# Patient Record
Sex: FEMALE | Race: ASIAN | NOT HISPANIC OR LATINO | URBAN - METROPOLITAN AREA
[De-identification: names, ages, dates, MRNs, and addresses within clinical notes are randomized per-mention and may not be internally consistent; named-entity substitution may affect disease eponyms.]

---

## 2024-05-06 ENCOUNTER — EMERGENCY (EMERGENCY)
Facility: HOSPITAL | Age: 59
LOS: 1 days | Discharge: HOME | End: 2024-05-06
Attending: EMERGENCY MEDICINE
Payer: MEDICAID

## 2024-05-06 VITALS
RESPIRATION RATE: 16 BRPM | HEART RATE: 74 BPM | SYSTOLIC BLOOD PRESSURE: 153 MMHG | DIASTOLIC BLOOD PRESSURE: 90 MMHG | TEMPERATURE: 98 F | OXYGEN SATURATION: 98 %

## 2024-05-06 VITALS
OXYGEN SATURATION: 99 % | HEART RATE: 81 BPM | TEMPERATURE: 99 F | WEIGHT: 158.07 LBS | HEIGHT: 62 IN | RESPIRATION RATE: 17 BRPM | DIASTOLIC BLOOD PRESSURE: 88 MMHG | SYSTOLIC BLOOD PRESSURE: 168 MMHG

## 2024-05-06 LAB
ALBUMIN SERPL ELPH-MCNC: 3.7 G/DL — SIGNIFICANT CHANGE UP (ref 3.5–5)
ALP SERPL-CCNC: 103 U/L — SIGNIFICANT CHANGE UP (ref 40–120)
ALT FLD-CCNC: 19 U/L DA — SIGNIFICANT CHANGE UP (ref 10–60)
ANION GAP SERPL CALC-SCNC: 4 MMOL/L — LOW (ref 5–17)
AST SERPL-CCNC: 11 U/L — SIGNIFICANT CHANGE UP (ref 10–40)
BASOPHILS # BLD AUTO: 0.04 K/UL — SIGNIFICANT CHANGE UP (ref 0–0.2)
BASOPHILS NFR BLD AUTO: 0.3 % — SIGNIFICANT CHANGE UP (ref 0–2)
BILIRUB SERPL-MCNC: 0.4 MG/DL — SIGNIFICANT CHANGE UP (ref 0.2–1.2)
BUN SERPL-MCNC: 9 MG/DL — SIGNIFICANT CHANGE UP (ref 7–18)
CALCIUM SERPL-MCNC: 9.1 MG/DL — SIGNIFICANT CHANGE UP (ref 8.4–10.5)
CHLORIDE SERPL-SCNC: 108 MMOL/L — SIGNIFICANT CHANGE UP (ref 96–108)
CO2 SERPL-SCNC: 28 MMOL/L — SIGNIFICANT CHANGE UP (ref 22–31)
CREAT SERPL-MCNC: 0.73 MG/DL — SIGNIFICANT CHANGE UP (ref 0.5–1.3)
EGFR: 95 ML/MIN/1.73M2 — SIGNIFICANT CHANGE UP
EOSINOPHIL # BLD AUTO: 0.07 K/UL — SIGNIFICANT CHANGE UP (ref 0–0.5)
EOSINOPHIL NFR BLD AUTO: 0.6 % — SIGNIFICANT CHANGE UP (ref 0–6)
GLUCOSE SERPL-MCNC: 159 MG/DL — HIGH (ref 70–99)
HCT VFR BLD CALC: 39.6 % — SIGNIFICANT CHANGE UP (ref 34.5–45)
HGB BLD-MCNC: 12 G/DL — SIGNIFICANT CHANGE UP (ref 11.5–15.5)
IMM GRANULOCYTES NFR BLD AUTO: 0.3 % — SIGNIFICANT CHANGE UP (ref 0–0.9)
LIDOCAIN IGE QN: 29 U/L — SIGNIFICANT CHANGE UP (ref 13–75)
LYMPHOCYTES # BLD AUTO: 3.9 K/UL — HIGH (ref 1–3.3)
LYMPHOCYTES # BLD AUTO: 31.6 % — SIGNIFICANT CHANGE UP (ref 13–44)
MCHC RBC-ENTMCNC: 22.8 PG — LOW (ref 27–34)
MCHC RBC-ENTMCNC: 30.3 GM/DL — LOW (ref 32–36)
MCV RBC AUTO: 75.1 FL — LOW (ref 80–100)
MONOCYTES # BLD AUTO: 0.47 K/UL — SIGNIFICANT CHANGE UP (ref 0–0.9)
MONOCYTES NFR BLD AUTO: 3.8 % — SIGNIFICANT CHANGE UP (ref 2–14)
NEUTROPHILS # BLD AUTO: 7.82 K/UL — HIGH (ref 1.8–7.4)
NEUTROPHILS NFR BLD AUTO: 63.4 % — SIGNIFICANT CHANGE UP (ref 43–77)
NRBC # BLD: 0 /100 WBCS — SIGNIFICANT CHANGE UP (ref 0–0)
PLATELET # BLD AUTO: 393 K/UL — SIGNIFICANT CHANGE UP (ref 150–400)
POTASSIUM SERPL-MCNC: 4.1 MMOL/L — SIGNIFICANT CHANGE UP (ref 3.5–5.3)
POTASSIUM SERPL-SCNC: 4.1 MMOL/L — SIGNIFICANT CHANGE UP (ref 3.5–5.3)
PROT SERPL-MCNC: 8.3 G/DL — SIGNIFICANT CHANGE UP (ref 6–8.3)
RBC # BLD: 5.27 M/UL — HIGH (ref 3.8–5.2)
RBC # FLD: 15.7 % — HIGH (ref 10.3–14.5)
SODIUM SERPL-SCNC: 140 MMOL/L — SIGNIFICANT CHANGE UP (ref 135–145)
WBC # BLD: 12.34 K/UL — HIGH (ref 3.8–10.5)
WBC # FLD AUTO: 12.34 K/UL — HIGH (ref 3.8–10.5)

## 2024-05-06 PROCEDURE — 83690 ASSAY OF LIPASE: CPT

## 2024-05-06 PROCEDURE — 74177 CT ABD & PELVIS W/CONTRAST: CPT | Mod: 26,MC

## 2024-05-06 PROCEDURE — 74177 CT ABD & PELVIS W/CONTRAST: CPT | Mod: MC

## 2024-05-06 PROCEDURE — 99284 EMERGENCY DEPT VISIT MOD MDM: CPT | Mod: 25

## 2024-05-06 PROCEDURE — 36415 COLL VENOUS BLD VENIPUNCTURE: CPT

## 2024-05-06 PROCEDURE — 80053 COMPREHEN METABOLIC PANEL: CPT

## 2024-05-06 PROCEDURE — 85025 COMPLETE CBC W/AUTO DIFF WBC: CPT

## 2024-05-06 PROCEDURE — 99285 EMERGENCY DEPT VISIT HI MDM: CPT

## 2024-05-06 RX ORDER — SODIUM CHLORIDE 9 MG/ML
1000 INJECTION, SOLUTION INTRAVENOUS ONCE
Refills: 0 | Status: COMPLETED | OUTPATIENT
Start: 2024-05-06 | End: 2024-05-06

## 2024-05-06 RX ORDER — ONDANSETRON 8 MG/1
4 TABLET, FILM COATED ORAL ONCE
Refills: 0 | Status: COMPLETED | OUTPATIENT
Start: 2024-05-06 | End: 2024-05-06

## 2024-05-06 RX ADMIN — Medication 30 MILLILITER(S): at 20:18

## 2024-05-06 RX ADMIN — SODIUM CHLORIDE 1000 MILLILITER(S): 9 INJECTION, SOLUTION INTRAVENOUS at 20:19

## 2024-05-06 NOTE — ED ADULT NURSE NOTE - NSFALLUNIVINTERV_ED_ALL_ED
Bed/Stretcher in lowest position, wheels locked, appropriate side rails in place/Call bell, personal items and telephone in reach/Instruct patient to call for assistance before getting out of bed/chair/stretcher/Non-slip footwear applied when patient is off stretcher/[place] to call system/Physically safe environment - no spills, clutter or unnecessary equipment/Purposeful proactive rounding/Room/bathroom lighting operational, light cord in reach

## 2024-05-06 NOTE — ED PROVIDER NOTE - PATIENT PORTAL LINK FT
You can access the FollowMyHealth Patient Portal offered by [facility] by registering at the following website: [email or web address]. By joining [employer]’s FollowMyHealth portal, you will also be able to view your health information using other applications (apps) compatible with our system.

## 2024-05-06 NOTE — ED PROVIDER NOTE - OBJECTIVE STATEMENT
58 yr old female with hx of HLD, HTN, DM presents to ed c/o abd bloating and pain [id number]. had colonoscopy with dr whitaker this am and received propofol 200mg IV. pt had internal hemorrhoid II and polypectomy. no nv, no fever.

## 2024-05-06 NOTE — ED PROVIDER NOTE - NSFOLLOWUPINSTRUCTIONS_ED_ALL_ED_FT
abdominal pain- Small hiatal hernia, ovarian cyst likely hemorrhagic on CT, cholelithiasis but no free air.  I suspect post colonoscopy discomfort advised to stay hydrated and stay active.  Return if symptoms worsens

## 2024-05-06 NOTE — ED PROVIDER NOTE - CLINICAL SUMMARY MEDICAL DECISION MAKING FREE TEXT BOX
58 yr old female with hx of HLD, HTN, DM presents to ed c/o abd bloating and pain [id number]. had colonoscopy with dr whitaker this am and received propofol 200mg IV. pt had internal hemorrhoid II and polypectomy. no nv, no fever.    r/o perforation vs gas bloating post colonoscopy. ct, labs, meds, -reassess

## 2024-05-06 NOTE — ED PROVIDER NOTE - PROGRESS NOTE DETAILS
appiah: Labs and CT reviewed.  Small hiatal hernia, ovarian cyst likely hemorrhagic on CT, cholelithiasis but no free air.  Labs unremarkable patient tolerating liquids still complaining of epigastric discomfort.  Not consistent with ACS at this point I suspect post colonoscopy discomfort advised to stay hydrated and stay active.  Return if symptoms worsens

## 2024-06-02 ENCOUNTER — INPATIENT (INPATIENT)
Facility: HOSPITAL | Age: 59
LOS: 5 days | Discharge: HOME | End: 2024-06-08
Attending: STUDENT IN AN ORGANIZED HEALTH CARE EDUCATION/TRAINING PROGRAM | Admitting: STUDENT IN AN ORGANIZED HEALTH CARE EDUCATION/TRAINING PROGRAM
Payer: MEDICAID

## 2024-06-02 VITALS
DIASTOLIC BLOOD PRESSURE: 83 MMHG | HEART RATE: 78 BPM | RESPIRATION RATE: 189 BRPM | OXYGEN SATURATION: 99 % | TEMPERATURE: 98 F | SYSTOLIC BLOOD PRESSURE: 136 MMHG

## 2024-06-02 LAB
ALBUMIN SERPL ELPH-MCNC: 4 G/DL — SIGNIFICANT CHANGE UP (ref 3.3–5)
ALP SERPL-CCNC: 82 U/L — SIGNIFICANT CHANGE UP (ref 40–120)
ALT FLD-CCNC: 11 U/L — SIGNIFICANT CHANGE UP (ref 4–33)
ANION GAP SERPL CALC-SCNC: 13 MMOL/L — SIGNIFICANT CHANGE UP (ref 7–14)
ANISOCYTOSIS BLD QL: SLIGHT — SIGNIFICANT CHANGE UP
APPEARANCE UR: ABNORMAL
AST SERPL-CCNC: 14 U/L — SIGNIFICANT CHANGE UP (ref 4–32)
BACTERIA # UR AUTO: ABNORMAL /HPF
BASOPHILS # BLD AUTO: 0 K/UL — SIGNIFICANT CHANGE UP (ref 0–0.2)
BASOPHILS NFR BLD AUTO: 0 % — SIGNIFICANT CHANGE UP (ref 0–2)
BILIRUB SERPL-MCNC: 0.2 MG/DL — SIGNIFICANT CHANGE UP (ref 0.2–1.2)
BILIRUB UR-MCNC: NEGATIVE — SIGNIFICANT CHANGE UP
BUN SERPL-MCNC: 8 MG/DL — SIGNIFICANT CHANGE UP (ref 7–23)
CALCIUM SERPL-MCNC: 9.3 MG/DL — SIGNIFICANT CHANGE UP (ref 8.4–10.5)
CAST: 0 /LPF — SIGNIFICANT CHANGE UP (ref 0–4)
CHLORIDE SERPL-SCNC: 105 MMOL/L — SIGNIFICANT CHANGE UP (ref 98–107)
CO2 SERPL-SCNC: 23 MMOL/L — SIGNIFICANT CHANGE UP (ref 22–31)
COLOR SPEC: YELLOW — SIGNIFICANT CHANGE UP
CREAT SERPL-MCNC: 0.61 MG/DL — SIGNIFICANT CHANGE UP (ref 0.5–1.3)
DACRYOCYTES BLD QL SMEAR: SLIGHT — SIGNIFICANT CHANGE UP
DIFF PNL FLD: NEGATIVE — SIGNIFICANT CHANGE UP
EGFR: 104 ML/MIN/1.73M2 — SIGNIFICANT CHANGE UP
ELLIPTOCYTES BLD QL SMEAR: SLIGHT — SIGNIFICANT CHANGE UP
EOSINOPHIL # BLD AUTO: 0 K/UL — SIGNIFICANT CHANGE UP (ref 0–0.5)
EOSINOPHIL NFR BLD AUTO: 0 % — SIGNIFICANT CHANGE UP (ref 0–6)
GIANT PLATELETS BLD QL SMEAR: PRESENT — SIGNIFICANT CHANGE UP
GLUCOSE SERPL-MCNC: 117 MG/DL — HIGH (ref 70–99)
GLUCOSE UR QL: NEGATIVE MG/DL — SIGNIFICANT CHANGE UP
HCT VFR BLD CALC: 36.2 % — SIGNIFICANT CHANGE UP (ref 34.5–45)
HGB BLD-MCNC: 11.6 G/DL — SIGNIFICANT CHANGE UP (ref 11.5–15.5)
HYPOCHROMIA BLD QL: SLIGHT — SIGNIFICANT CHANGE UP
IANC: 5.36 K/UL — SIGNIFICANT CHANGE UP (ref 1.8–7.4)
KETONES UR-MCNC: NEGATIVE MG/DL — SIGNIFICANT CHANGE UP
LEUKOCYTE ESTERASE UR-ACNC: ABNORMAL
LIDOCAIN IGE QN: 23 U/L — SIGNIFICANT CHANGE UP (ref 7–60)
LYMPHOCYTES # BLD AUTO: 3.49 K/UL — HIGH (ref 1–3.3)
LYMPHOCYTES # BLD AUTO: 35.1 % — SIGNIFICANT CHANGE UP (ref 13–44)
MACROCYTES BLD QL: SLIGHT — SIGNIFICANT CHANGE UP
MCHC RBC-ENTMCNC: 23.4 PG — LOW (ref 27–34)
MCHC RBC-ENTMCNC: 32 GM/DL — SIGNIFICANT CHANGE UP (ref 32–36)
MCV RBC AUTO: 73.1 FL — LOW (ref 80–100)
MICROCYTES BLD QL: SLIGHT — SIGNIFICANT CHANGE UP
MONOCYTES # BLD AUTO: 0.44 K/UL — SIGNIFICANT CHANGE UP (ref 0–0.9)
MONOCYTES NFR BLD AUTO: 4.4 % — SIGNIFICANT CHANGE UP (ref 2–14)
NEUTROPHILS # BLD AUTO: 5.67 K/UL — SIGNIFICANT CHANGE UP (ref 1.8–7.4)
NEUTROPHILS NFR BLD AUTO: 57 % — SIGNIFICANT CHANGE UP (ref 43–77)
NITRITE UR-MCNC: NEGATIVE — SIGNIFICANT CHANGE UP
PH UR: 8 — SIGNIFICANT CHANGE UP (ref 5–8)
PLAT MORPH BLD: NORMAL — SIGNIFICANT CHANGE UP
PLATELET # BLD AUTO: 356 K/UL — SIGNIFICANT CHANGE UP (ref 150–400)
PLATELET COUNT - ESTIMATE: NORMAL — SIGNIFICANT CHANGE UP
POIKILOCYTOSIS BLD QL AUTO: SIGNIFICANT CHANGE UP
POLYCHROMASIA BLD QL SMEAR: SLIGHT — SIGNIFICANT CHANGE UP
POTASSIUM SERPL-MCNC: 4.5 MMOL/L — SIGNIFICANT CHANGE UP (ref 3.5–5.3)
POTASSIUM SERPL-SCNC: 4.5 MMOL/L — SIGNIFICANT CHANGE UP (ref 3.5–5.3)
PROT SERPL-MCNC: 7.4 G/DL — SIGNIFICANT CHANGE UP (ref 6–8.3)
PROT UR-MCNC: NEGATIVE MG/DL — SIGNIFICANT CHANGE UP
RBC # BLD: 4.95 M/UL — SIGNIFICANT CHANGE UP (ref 3.8–5.2)
RBC # FLD: 15.9 % — HIGH (ref 10.3–14.5)
RBC BLD AUTO: ABNORMAL
RBC CASTS # UR COMP ASSIST: 0 /HPF — SIGNIFICANT CHANGE UP (ref 0–4)
SCHISTOCYTES BLD QL AUTO: SLIGHT — SIGNIFICANT CHANGE UP
SODIUM SERPL-SCNC: 141 MMOL/L — SIGNIFICANT CHANGE UP (ref 135–145)
SP GR SPEC: 1.01 — SIGNIFICANT CHANGE UP (ref 1–1.03)
SQUAMOUS # UR AUTO: 20 /HPF — HIGH (ref 0–5)
TARGETS BLD QL SMEAR: SLIGHT — SIGNIFICANT CHANGE UP
TROPONIN T, HIGH SENSITIVITY RESULT: 8 NG/L — SIGNIFICANT CHANGE UP
UROBILINOGEN FLD QL: 0.2 MG/DL — SIGNIFICANT CHANGE UP (ref 0.2–1)
VARIANT LYMPHS # BLD: 3.5 % — SIGNIFICANT CHANGE UP (ref 0–6)
WBC # BLD: 9.95 K/UL — SIGNIFICANT CHANGE UP (ref 3.8–10.5)
WBC # FLD AUTO: 9.95 K/UL — SIGNIFICANT CHANGE UP (ref 3.8–10.5)
WBC UR QL: 17 /HPF — HIGH (ref 0–5)

## 2024-06-02 PROCEDURE — 99285 EMERGENCY DEPT VISIT HI MDM: CPT

## 2024-06-02 PROCEDURE — 76705 ECHO EXAM OF ABDOMEN: CPT | Mod: 26

## 2024-06-02 RX ORDER — ACETAMINOPHEN 500 MG
1000 TABLET ORAL ONCE
Refills: 0 | Status: COMPLETED | OUTPATIENT
Start: 2024-06-02 | End: 2024-06-02

## 2024-06-02 RX ORDER — MORPHINE SULFATE 50 MG/1
4 CAPSULE, EXTENDED RELEASE ORAL ONCE
Refills: 0 | Status: DISCONTINUED | OUTPATIENT
Start: 2024-06-02 | End: 2024-06-02

## 2024-06-02 RX ADMIN — Medication 400 MILLIGRAM(S): at 21:16

## 2024-06-02 RX ADMIN — MORPHINE SULFATE 4 MILLIGRAM(S): 50 CAPSULE, EXTENDED RELEASE ORAL at 21:21

## 2024-06-02 NOTE — ED PROVIDER NOTE - ATTENDING CONTRIBUTION TO CARE
58F p/w SOB and abd pain.  Was dx with ovarian mass? unclear if pt getting treatment.  EPig and RUQ ttp.  Pt in a lot of pain.  Lungs clear.  No fever.  EKG SR at 70 no shadia no std no twi  qtc 442.  Pt was seen at [facility] several times and admitted in late May until yesterday with similar complaint, had CTPA and CT a/p showing 4.7cm R adnexa heterogenous mass, also gallstones.  CTPA was negative.  Pt was seen by OBGYN there and given a follow up appointment in July.  Med list also copied into chart.  amlodipine, asa, atorvastatin, famotidine, glimepiride, hydroxyzine, losartan, melatonin, metformin.    Pt mild distress abd pain.  Plan recheck CT abd and CTPA.  Rx pain meds and fluids.  If Ov mass appreciated, GYN consult.  Pt would benefit from IP biopsy given abd pain.    VS:  unremarkable    GEN - malaise, abd pain, mild distress;   A+O x3   HEAD - NC/AT     ENT - PEERL, EOMI, mucous membranes   dry , no discharge      NECK: Neck supple, non-tender without lymphadenopathy, no masses, no JVD  PULM - CTA b/l,  symmetric breath sounds  COR -  normal heart sounds    ABD - , ND, upper abd ttp, periumbilical ttp, soft,  BACK - no CVA tenderness, nontender spine     EXTREMS - no edema, no deformity, warm and well perfused    SKIN - no rash    or bruising      NEUROLOGIC - alert, face symmetric, speech fluent, sensation nl, motor no focal deficit.

## 2024-06-02 NOTE — ED PROVIDER NOTE - CARE PLAN
1 Principal Discharge DX:	Shortness of breath   Principal Discharge DX:	Shortness of breath  Secondary Diagnosis:	Abdominal pain

## 2024-06-02 NOTE — ED ADULT NURSE NOTE - NSFALLRISKINTERV_ED_ALL_ED

## 2024-06-02 NOTE — ED PROVIDER NOTE - OBJECTIVE STATEMENT
see mdm see mdm    DD ED ATTF p/w SOB and abd pain.  Was dx with ovarian mass? unclear if pt getting treatment.  EPig and RUQ ttp.  Pt in a lot of pain.  Lungs clear.  No fever.  EKG SR at 70 no shadia no std no twi  qtc 442.  Pt was seen at [facility] several times and admitted in late May until yesterday with similar complaint, had CTPA and CT a/p showing 4.7cm R adnexa heterogenous mass.  CTPA was negative.  Pt was seen by OBGYN there and given a follow up appointment in July.  Med list also copied into chart.  amlodipine, asa, atorvastatin, famotidine, glimepiride, hydroxyzine, losartan, melatonin, metformin.

## 2024-06-02 NOTE — ED ADULT NURSE NOTE - OBJECTIVE STATEMENT
presents with sob/inability to sleep 2/2 abd pain. Endorsed she recently had a mass that she had to remove but unclear on history. Husbands knows more information.

## 2024-06-02 NOTE — ED PROVIDER NOTE - CLINICAL SUMMARY MEDICAL DECISION MAKING FREE TEXT BOX
58-year-old woman with history of diabetes, hypertension, hyperlipidemia, cholelithiasis, complex cystic right adnexal mass presents to the ED with abdominal pain and shortness of breath for the past day.  Patient states she had multiple ED visits for the same symptoms.  Patient denies nausea, vomiting, fevers, chills.  Patient afebrile hemodynamically stable.  Concern for PE, cholecystitis, ACS.  Dispo pending labs, imaging and reassessment.

## 2024-06-03 DIAGNOSIS — R10.9 UNSPECIFIED ABDOMINAL PAIN: ICD-10-CM

## 2024-06-03 DIAGNOSIS — R06.02 SHORTNESS OF BREATH: ICD-10-CM

## 2024-06-03 DIAGNOSIS — I10 ESSENTIAL (PRIMARY) HYPERTENSION: ICD-10-CM

## 2024-06-03 DIAGNOSIS — Z29.9 ENCOUNTER FOR PROPHYLACTIC MEASURES, UNSPECIFIED: ICD-10-CM

## 2024-06-03 DIAGNOSIS — E11.9 TYPE 2 DIABETES MELLITUS WITHOUT COMPLICATIONS: ICD-10-CM

## 2024-06-03 DIAGNOSIS — N94.89 OTHER SPECIFIED CONDITIONS ASSOCIATED WITH FEMALE GENITAL ORGANS AND MENSTRUAL CYCLE: ICD-10-CM

## 2024-06-03 LAB
ADD ON TEST-SPECIMEN IN LAB: SIGNIFICANT CHANGE UP
ALBUMIN SERPL ELPH-MCNC: 4.1 G/DL — SIGNIFICANT CHANGE UP (ref 3.3–5)
ALP SERPL-CCNC: 87 U/L — SIGNIFICANT CHANGE UP (ref 40–120)
ALT FLD-CCNC: 14 U/L — SIGNIFICANT CHANGE UP (ref 4–33)
ANION GAP SERPL CALC-SCNC: 10 MMOL/L — SIGNIFICANT CHANGE UP (ref 7–14)
AST SERPL-CCNC: 15 U/L — SIGNIFICANT CHANGE UP (ref 4–32)
BASOPHILS # BLD AUTO: 0.03 K/UL — SIGNIFICANT CHANGE UP (ref 0–0.2)
BASOPHILS NFR BLD AUTO: 0.4 % — SIGNIFICANT CHANGE UP (ref 0–2)
BILIRUB SERPL-MCNC: 0.3 MG/DL — SIGNIFICANT CHANGE UP (ref 0.2–1.2)
BUN SERPL-MCNC: 8 MG/DL — SIGNIFICANT CHANGE UP (ref 7–23)
CALCIUM SERPL-MCNC: 9.3 MG/DL — SIGNIFICANT CHANGE UP (ref 8.4–10.5)
CHLORIDE SERPL-SCNC: 105 MMOL/L — SIGNIFICANT CHANGE UP (ref 98–107)
CO2 SERPL-SCNC: 27 MMOL/L — SIGNIFICANT CHANGE UP (ref 22–31)
CREAT SERPL-MCNC: 0.57 MG/DL — SIGNIFICANT CHANGE UP (ref 0.5–1.3)
CRP SERPL-MCNC: 3.2 MG/L — SIGNIFICANT CHANGE UP
CULTURE RESULTS: SIGNIFICANT CHANGE UP
EGFR: 105 ML/MIN/1.73M2 — SIGNIFICANT CHANGE UP
EOSINOPHIL # BLD AUTO: 0.13 K/UL — SIGNIFICANT CHANGE UP (ref 0–0.5)
EOSINOPHIL NFR BLD AUTO: 1.7 % — SIGNIFICANT CHANGE UP (ref 0–6)
ERYTHROCYTE [SEDIMENTATION RATE] IN BLOOD: 28 MM/HR — HIGH (ref 4–25)
GLUCOSE BLDC GLUCOMTR-MCNC: 116 MG/DL — HIGH (ref 70–99)
GLUCOSE BLDC GLUCOMTR-MCNC: 118 MG/DL — HIGH (ref 70–99)
GLUCOSE BLDC GLUCOMTR-MCNC: 141 MG/DL — HIGH (ref 70–99)
GLUCOSE SERPL-MCNC: 122 MG/DL — HIGH (ref 70–99)
HCT VFR BLD CALC: 38.4 % — SIGNIFICANT CHANGE UP (ref 34.5–45)
HGB BLD-MCNC: 11.5 G/DL — SIGNIFICANT CHANGE UP (ref 11.5–15.5)
IANC: 3.97 K/UL — SIGNIFICANT CHANGE UP (ref 1.8–7.4)
IMM GRANULOCYTES NFR BLD AUTO: 0.3 % — SIGNIFICANT CHANGE UP (ref 0–0.9)
LYMPHOCYTES # BLD AUTO: 2.95 K/UL — SIGNIFICANT CHANGE UP (ref 1–3.3)
LYMPHOCYTES # BLD AUTO: 39 % — SIGNIFICANT CHANGE UP (ref 13–44)
MAGNESIUM SERPL-MCNC: 2.5 MG/DL — SIGNIFICANT CHANGE UP (ref 1.6–2.6)
MCHC RBC-ENTMCNC: 22.5 PG — LOW (ref 27–34)
MCHC RBC-ENTMCNC: 29.9 GM/DL — LOW (ref 32–36)
MCV RBC AUTO: 75.3 FL — LOW (ref 80–100)
MONOCYTES # BLD AUTO: 0.46 K/UL — SIGNIFICANT CHANGE UP (ref 0–0.9)
MONOCYTES NFR BLD AUTO: 6.1 % — SIGNIFICANT CHANGE UP (ref 2–14)
NEUTROPHILS # BLD AUTO: 3.97 K/UL — SIGNIFICANT CHANGE UP (ref 1.8–7.4)
NEUTROPHILS NFR BLD AUTO: 52.5 % — SIGNIFICANT CHANGE UP (ref 43–77)
NRBC # BLD: 0 /100 WBCS — SIGNIFICANT CHANGE UP (ref 0–0)
NRBC # FLD: 0 K/UL — SIGNIFICANT CHANGE UP (ref 0–0)
PHOSPHATE SERPL-MCNC: 4.2 MG/DL — SIGNIFICANT CHANGE UP (ref 2.5–4.5)
PLATELET # BLD AUTO: 361 K/UL — SIGNIFICANT CHANGE UP (ref 150–400)
POTASSIUM SERPL-MCNC: 4.2 MMOL/L — SIGNIFICANT CHANGE UP (ref 3.5–5.3)
POTASSIUM SERPL-SCNC: 4.2 MMOL/L — SIGNIFICANT CHANGE UP (ref 3.5–5.3)
PROCALCITONIN SERPL-MCNC: 0.04 NG/ML — SIGNIFICANT CHANGE UP (ref 0.02–0.1)
PROT SERPL-MCNC: 7.5 G/DL — SIGNIFICANT CHANGE UP (ref 6–8.3)
RBC # BLD: 5.1 M/UL — SIGNIFICANT CHANGE UP (ref 3.8–5.2)
RBC # FLD: 15.9 % — HIGH (ref 10.3–14.5)
SODIUM SERPL-SCNC: 142 MMOL/L — SIGNIFICANT CHANGE UP (ref 135–145)
SPECIMEN SOURCE: SIGNIFICANT CHANGE UP
TROPONIN T, HIGH SENSITIVITY RESULT: 8 NG/L — SIGNIFICANT CHANGE UP
WBC # BLD: 7.56 K/UL — SIGNIFICANT CHANGE UP (ref 3.8–10.5)
WBC # FLD AUTO: 7.56 K/UL — SIGNIFICANT CHANGE UP (ref 3.8–10.5)

## 2024-06-03 PROCEDURE — 74177 CT ABD & PELVIS W/CONTRAST: CPT | Mod: 26,MC

## 2024-06-03 PROCEDURE — 99223 1ST HOSP IP/OBS HIGH 75: CPT | Mod: GC

## 2024-06-03 PROCEDURE — 72196 MRI PELVIS W/DYE: CPT | Mod: 26

## 2024-06-03 PROCEDURE — 78226 HEPATOBILIARY SYSTEM IMAGING: CPT | Mod: 26,GC

## 2024-06-03 PROCEDURE — 71275 CT ANGIOGRAPHY CHEST: CPT | Mod: 26,MC

## 2024-06-03 PROCEDURE — 76830 TRANSVAGINAL US NON-OB: CPT | Mod: 26

## 2024-06-03 RX ORDER — PANTOPRAZOLE SODIUM 20 MG/1
40 TABLET, DELAYED RELEASE ORAL DAILY
Refills: 0 | Status: DISCONTINUED | OUTPATIENT
Start: 2024-06-03 | End: 2024-06-04

## 2024-06-03 RX ORDER — ENOXAPARIN SODIUM 100 MG/ML
40 INJECTION SUBCUTANEOUS EVERY 24 HOURS
Refills: 0 | Status: DISCONTINUED | OUTPATIENT
Start: 2024-06-03 | End: 2024-06-08

## 2024-06-03 RX ORDER — FAMOTIDINE 10 MG/ML
20 INJECTION INTRAVENOUS ONCE
Refills: 0 | Status: COMPLETED | OUTPATIENT
Start: 2024-06-03 | End: 2024-06-03

## 2024-06-03 RX ORDER — GLUCAGON INJECTION, SOLUTION 0.5 MG/.1ML
1 INJECTION, SOLUTION SUBCUTANEOUS ONCE
Refills: 0 | Status: DISCONTINUED | OUTPATIENT
Start: 2024-06-03 | End: 2024-06-08

## 2024-06-03 RX ORDER — INSULIN LISPRO 100/ML
VIAL (ML) SUBCUTANEOUS EVERY 6 HOURS
Refills: 0 | Status: DISCONTINUED | OUTPATIENT
Start: 2024-06-03 | End: 2024-06-08

## 2024-06-03 RX ORDER — KETOROLAC TROMETHAMINE 30 MG/ML
15 SYRINGE (ML) INJECTION ONCE
Refills: 0 | Status: DISCONTINUED | OUTPATIENT
Start: 2024-06-03 | End: 2024-06-03

## 2024-06-03 RX ORDER — SODIUM CHLORIDE 9 MG/ML
1000 INJECTION, SOLUTION INTRAVENOUS
Refills: 0 | Status: DISCONTINUED | OUTPATIENT
Start: 2024-06-03 | End: 2024-06-05

## 2024-06-03 RX ORDER — SODIUM CHLORIDE 9 MG/ML
1000 INJECTION, SOLUTION INTRAVENOUS ONCE
Refills: 0 | Status: COMPLETED | OUTPATIENT
Start: 2024-06-03 | End: 2024-06-03

## 2024-06-03 RX ORDER — LOSARTAN POTASSIUM 100 MG/1
25 TABLET, FILM COATED ORAL DAILY
Refills: 0 | Status: DISCONTINUED | OUTPATIENT
Start: 2024-06-03 | End: 2024-06-06

## 2024-06-03 RX ORDER — AMLODIPINE BESYLATE 2.5 MG/1
5 TABLET ORAL DAILY
Refills: 0 | Status: DISCONTINUED | OUTPATIENT
Start: 2024-06-03 | End: 2024-06-06

## 2024-06-03 RX ORDER — LOSARTAN POTASSIUM 100 MG/1
1 TABLET, FILM COATED ORAL
Refills: 0 | DISCHARGE

## 2024-06-03 RX ORDER — METFORMIN HYDROCHLORIDE 850 MG/1
1 TABLET ORAL
Refills: 0 | DISCHARGE

## 2024-06-03 RX ORDER — DEXTROSE 50 % IN WATER 50 %
25 SYRINGE (ML) INTRAVENOUS ONCE
Refills: 0 | Status: DISCONTINUED | OUTPATIENT
Start: 2024-06-03 | End: 2024-06-08

## 2024-06-03 RX ORDER — DEXTROSE 50 % IN WATER 50 %
15 SYRINGE (ML) INTRAVENOUS ONCE
Refills: 0 | Status: DISCONTINUED | OUTPATIENT
Start: 2024-06-03 | End: 2024-06-08

## 2024-06-03 RX ORDER — DEXTROSE 50 % IN WATER 50 %
12.5 SYRINGE (ML) INTRAVENOUS ONCE
Refills: 0 | Status: DISCONTINUED | OUTPATIENT
Start: 2024-06-03 | End: 2024-06-08

## 2024-06-03 RX ADMIN — SODIUM CHLORIDE 100 MILLILITER(S): 9 INJECTION, SOLUTION INTRAVENOUS at 11:40

## 2024-06-03 RX ADMIN — SODIUM CHLORIDE 1000 MILLILITER(S): 9 INJECTION, SOLUTION INTRAVENOUS at 10:26

## 2024-06-03 RX ADMIN — MORPHINE SULFATE 4 MILLIGRAM(S): 50 CAPSULE, EXTENDED RELEASE ORAL at 00:06

## 2024-06-03 RX ADMIN — Medication 1000 MILLIGRAM(S): at 00:06

## 2024-06-03 RX ADMIN — Medication 30 MILLILITER(S): at 05:05

## 2024-06-03 RX ADMIN — FAMOTIDINE 20 MILLIGRAM(S): 10 INJECTION INTRAVENOUS at 05:05

## 2024-06-03 RX ADMIN — PANTOPRAZOLE SODIUM 40 MILLIGRAM(S): 20 TABLET, DELAYED RELEASE ORAL at 10:28

## 2024-06-03 RX ADMIN — Medication 0.25 MILLIGRAM(S): at 15:34

## 2024-06-03 NOTE — CONSULT NOTE ADULT - SUBJECTIVE AND OBJECTIVE BOX
SURGERY CONSULT NOTE    HPI:    57 yo F w/ PMHx of DM, HTN, cholelithiasis, complex cystic right adnexal mass no PSHx presents to  ED with abdominal pain and shortness of breath worsening over the past day admitted to medicine for further w/u.     In the ED, underwent CT A/P showed complex R adnexal lesion and cholelithiais. RUQ US showed cholelithasis, layer gallbladder sludge, CBD 3mm. Normal WBC, T bili, LFTs. Surgery consulted for r/o acute sami.     Patient states has had intermittent RUQ pain x 5 months w/ multiple ED visits at OSH diagnosed with galltones and right adnexal cystic lesion. Was told to follow-up with outpatient Gyn and surgery however has bene unable to do so. Also was seen by GI who completed EGD/Colonoscopy in May, which showed gastritis, hemorrhoids and single non-bleeding polyp in the ascending colon and s/p biopsy taken. Patient does not have with her biopsy results.     MRI pelvis eval R adnexal cyst pending       PAST MEDICAL HISTORY:  No pertinent past medical history    HTN (hypertension)    HLD (hyperlipidemia)    Diabetes mellitus        PAST SURGICAL HISTORY:  No significant past surgical history        MEDICATIONS:  amLODIPine   Tablet 5 milliGRAM(s) Oral daily  dextrose 50% Injectable 25 Gram(s) IV Push once  dextrose 50% Injectable 12.5 Gram(s) IV Push once  dextrose 50% Injectable 25 Gram(s) IV Push once  dextrose Oral Gel 15 Gram(s) Oral once  glucagon  Injectable 1 milliGRAM(s) IntraMuscular once  insulin lispro (ADMELOG) corrective regimen sliding scale   SubCutaneous every 6 hours  lactated ringers. 1000 milliLiter(s) IV Continuous <Continuous>  losartan 25 milliGRAM(s) Oral daily  pantoprazole  Injectable 40 milliGRAM(s) IV Push daily      ALLERGIES:  No Known Allergies      VITALS & I/Os:  Vital Signs Last 24 Hrs  T(C): 36.8 (03 Jun 2024 10:18), Max: 36.9 (02 Jun 2024 21:23)  T(F): 98.3 (03 Jun 2024 10:18), Max: 98.4 (02 Jun 2024 21:23)  HR: 60 (03 Jun 2024 10:18) (56 - 78)  BP: 133/78 (03 Jun 2024 10:18) (120/73 - 143/93)  BP(mean): --  RR: 18 (03 Jun 2024 10:18) (16 - 189)  SpO2: 97% (03 Jun 2024 10:18) (97% - 100%)    Parameters below as of 03 Jun 2024 10:18  Patient On (Oxygen Delivery Method): room air      PHYSICAL EXAM:  General: No acute distress  Respiratory: Nonlabored  Cardiovascular: RRR  Abdominal: Soft, nondistended, tender to palpation in R hemiabdomen. No rebound or guarding. No organomegaly, no palpable mass.  Extremities: Warm    LABS:                        11.5   7.56  )-----------( 361      ( 03 Jun 2024 07:59 )             38.4     06-03    142  |  105  |  8   ----------------------------<  122<H>  4.2   |  27  |  0.57    Ca    9.3      03 Jun 2024 07:59  Phos  4.2     06-03  Mg     2.50     06-03    TPro  7.5  /  Alb  4.1  /  TBili  0.3  /  DBili  x   /  AST  15  /  ALT  14  /  AlkPhos  87  06-03      Urinalysis Basic - ( 03 Jun 2024 07:59 )    Color: x / Appearance: x / SG: x / pH: x  Gluc: 122 mg/dL / Ketone: x  / Bili: x / Urobili: x   Blood: x / Protein: x / Nitrite: x   Leuk Esterase: x / RBC: x / WBC x   Sq Epi: x / Non Sq Epi: x / Bacteria: x

## 2024-06-03 NOTE — H&P ADULT - HISTORY OF PRESENT ILLNESS
58-year-old woman with history of diabetes, hypertension, hyperlipidemia, cholelithiasis, complex cystic right adnexal mass presents to the ED with abdominal pain and shortness of breath worsening over the past day. Mentions since she had a fall in Jan 2024 has had severe and constant abdominal pain in upper abdomen. Describes it as sharp 10/10 pain in b/l upper abdominal quadrants thats worse when palpating abdomen. Associated with burning sensation in sternum region. Pain is sometimes worse with eating. Constant throughout the day.  Mentions to have had multiple ED visits since then for abdominal pain, last being in May 1st. Imaging found at that time solid echogenic mass that was described as cystic in right adnexal region. Was told to follow-up with outpatient Gyn, however, has been unable to. Also was seen by GI who completed EGD/Colonoscopy in May, which showed gastritis, hemorrhoids and single non-bleeding polyp in the ascending colon and s/p biopsy taken. Patient does not have with her biopsy results. Currently denies fevers, chills, runny nose, sore throat, changes in BMs or urinary symptoms.     ED course: vitals stable, satting fine on RA. Labs were unremarkable, LFTs wnl. CT chest confirmed no PE seen. CT A/P showed complex R adnexal lesion that may be subserosal fibroid vs ovarian lesion US transvaginal obtained which showed echogenic R ovarian lesion suggestive of ovarian cyst.  US abdomen was obtained, which showed cholelithiasis and hepatic steatosis but no cholecystitis. Received Famotidine 20, Morphine 4 mg, Maalox, and Tylenol

## 2024-06-03 NOTE — H&P ADULT - PROBLEM SELECTOR PLAN 1
P/w consistent and severe abdominal pain, more so worse in upper quadrant region. Rodarte sign positive significantly on exam.   -CT A/P: A 4.3 cm complex right adnexal lesion which may represent a subserosal fibroid or ovarian lesion. Ultrasound correlation is recommended.  -US abdomen: Cholelithiasis and Hepatic steatosis.  -LFTs wnl  -unclear etiology--> less likely adnexal mass is causing significant abdominal pain  -concerns for biliary colic vs developing cholecystitis, though imaging is negative, vs gastritis   -Surgery consulted, pending further recs  -NPO unless cleared by Surgery, r/o cholecystitis  -PPI w/ protonix 40mg qd  -

## 2024-06-03 NOTE — H&P ADULT - ASSESSMENT
58-year-old woman with history of diabetes, hypertension, hyperlipidemia, cholelithiasis, complex cystic right adnexal mass presents to the ED with abdominal pain and shortness of breath found to have known adnexal mass and concerns for biliary colic vs cholecystitis admitted for further work-up and management.

## 2024-06-03 NOTE — H&P ADULT - PROBLEM SELECTOR PLAN 4
Home regimen: Amlodipine 5 mg qd and Losartan 25mg qd  -resume home amlodipine and losartan with hold paramters

## 2024-06-03 NOTE — H&P ADULT - NSHPLABSRESULTS_GEN_ALL_CORE
11.5   7.56  )-----------( 361      ( 03 Jun 2024 07:59 )             38.4       06-03    142  |  105  |  8   ----------------------------<  122<H>  4.2   |  27  |  0.57    Ca    9.3      03 Jun 2024 07:59  Phos  4.2     06-03  Mg     2.50     06-03    TPro  7.5  /  Alb  4.1  /  TBili  0.3  /  DBili  x   /  AST  15  /  ALT  14  /  AlkPhos  87  06-03              Urinalysis Basic - ( 03 Jun 2024 07:59 )    Color: x / Appearance: x / SG: x / pH: x  Gluc: 122 mg/dL / Ketone: x  / Bili: x / Urobili: x   Blood: x / Protein: x / Nitrite: x   Leuk Esterase: x / RBC: x / WBC x   Sq Epi: x / Non Sq Epi: x / Bacteria: x            Lactate Trend            CAPILLARY BLOOD GLUCOSE      POCT Blood Glucose.: 130 mg/dL (02 Jun 2024 20:29)

## 2024-06-03 NOTE — H&P ADULT - ATTENDING COMMENTS
Pt p/w several months of colicky abd pain of undetermined etiology, though distribution in RUQ and +Rodarte's sign on exam suggest possible hepatobiliary source. US without signs consistent with cholecystitis. Nevertheless, agree with Surgery that HIDA should be pursued, especially given DM2. Pt also with right adnexal mass which, at this time, does not seem to explain the patient's abd pain. Will obtain MR to better characterize this lesion. Monitor glucose closely.

## 2024-06-03 NOTE — ED ADULT NURSE REASSESSMENT NOTE - CONDITION
Patient refused IV toradol. Attending Dr. Carter aware. States that her doctor said she couldn't have motrin. Transported to ultrasound. NAD noted. Will continue to monitor.

## 2024-06-03 NOTE — H&P ADULT - NSICDXPASTMEDICALHX_GEN_ALL_CORE_FT
PAST MEDICAL HISTORY:  Diabetes mellitus     HLD (hyperlipidemia)     HTN (hypertension)     No pertinent past medical history

## 2024-06-03 NOTE — PATIENT PROFILE ADULT - FALL HARM RISK - HARM RISK INTERVENTIONS

## 2024-06-03 NOTE — ED ADULT NURSE REASSESSMENT NOTE - CONDITION
Patient resting comfortably with  at bedside. Report taken from EDDIE Harris Bed placed in lowest position. Pillow given. NAD noted. Awaiting ct scan. Will continue to monitor.

## 2024-06-03 NOTE — ED ADULT NURSE REASSESSMENT NOTE - CONDITION
Report taken from EDDIE Roa Patient resting comfortably. Stretcher in lowest postiion. Side rails up. NAD noted. Will continue to monitor.

## 2024-06-03 NOTE — PHYSICAL THERAPY INITIAL EVALUATION ADULT - PATIENT PROFILE REVIEW, REHAB EVAL
PT initial evaluation received and chart review completed. Pt agreeable to participate in PT evaluation. ACTIVITY:/yes PT initial evaluation received and chart review completed. Pt agreeable to participate in PT evaluation. ACTIVITY: AMBULATE AS TOLERATED/yes

## 2024-06-03 NOTE — PATIENT PROFILE ADULT - NSPRESCRUSEDDRG_GEN_A_NUR
[facility]  Dermatology  [address] [address]  Phone: [phone]  Fax: [phone]    [address] [address]  Phone: [phone]  Fax: [phone]    [facility]  Dermatology  [address] [address]  Phone: [phone]  Fax:   Follow Up Time:     [address] [address]  Phone: [phone]  Fax: [phone]  Follow Up Time:     [facility]  Dermatology  [address] [address]  Phone: [phone]  Fax: [phone]    [address] [address]  Phone: [phone]  Fax: [phone]    [facility]  Dermatology  [address] [address]  Phone: [phone]  Fax:   Follow Up Time:     [address] [address]  Phone: [phone]  Fax: [phone]  Follow Up Time:     Rash    A rash is a change in the color of the skin. A rash can also change the way your skin feels. There are many different conditions and factors that can cause a rash.     Follow these instructions at home:  Pay attention to any changes in your symptoms. Follow these instructions to help with your condition:    Medicine    Take or apply over-the-counter and prescription medicines only as told by your health care provider. These may include:    Corticosteroid cream.  Anti-itch lotions.  Oral antihistamines.    Skin Care    Apply cool compresses to the affected areas.  Try taking a bath with:  Epsom salts. Follow the instructions on the packaging. You can get these at your local pharmacy or grocery store.  Baking soda. Pour a small amount into the bath as told by your health care provider.  Colloidal oatmeal. Follow the instructions on the packaging. You can get this at your local pharmacy or grocery store.  Try applying baking soda paste to your skin. Stir water into baking soda until it reaches a paste-like consistency.  Do not scratch or rub your skin.  Avoid covering the rash. Make sure the rash is exposed to air as much as possible.    General instructions    Avoid hot showers or baths, which can make itching worse. A cold shower may help.  Avoid scented soaps, detergents, and perfumes. Use gentle soaps, detergents, perfumes, and other cosmetic products.  Avoid any substance that causes your rash. Keep a journal to help track what causes your rash. Write down:  What you eat.  What cosmetic products you use.  What you drink.  What you wear. This includes jewelry.  Keep all follow-up visits as told by your health care provider. This is important.    Contact a health care provider if:  You sweat at night.  You lose weight.  You urinate more than normal.  You feel weak.  You vomit.  Your skin or the whites of your eyes look yellow (jaundice).  Your skin:  Tingles.  Is numb.  Your rash:  Does not go away after several days.  Gets worse.  You are:  Unusually thirsty.  More tired than normal.  You have:  New symptoms.  Pain in your abdomen.  A fever.  Diarrhea.    Get help right away if:  You develop a rash that covers all or most of your body. The rash may or may not be painful.  You develop blisters that:  Are on top of the rash.  Grow larger or grow together.  Are painful.  Are inside your nose or mouth.  You develop a rash that:  Looks like purple pinprick-sized spots all over your body.  Has a “bull's eye” or looks like a target.  Is not related to sun exposure, is red and painful, and causes your skin to peel.    ADDITIONAL NOTES AND INSTRUCTIONS    Please follow up with your Primary MD in 24-48 hr.  Seek immediate medical care for any new/worsening signs or symptoms.     Document Released: 12/8/2003 Document Revised: 5/23/2017 Document Reviewed: 5/4/2016  [employer] Interactive Patient Education ©2019 [employer] This information is not intended to replace advice given to you by your health care provider. Make sure you discuss any questions you have with your health care provider. THE PATIENT WAS GIVEN THE FOLLOWING INSTRUCTIONS BELOW IN: [place]    [facility]  Dermatology  [address] [address]  Phone: [phone]  Fax: [phone]    [address], [address] [address]  Phone: [phone]  Fax: [phone]    [facility]  Dermatology  [address] [address]  Phone: [phone]  Fax:   Follow Up Time:     [address] [address]  Phone: [phone]  Fax: [phone]  Follow Up Time:     [facility]  [address] [address]  Phone: [phone]  Fax: [phone]    [address], [address] [address]  Phone: [phone]  Fax: [phone]    [facility]  Dermatology  [address] [address]  Phone: [phone]  Fax:   Follow Up Time:     [address] [address]  Phone: [phone]  Fax: [phone]  Follow Up Time:     Rash    A rash is a change in the color of the skin. A rash can also change the way your skin feels. There are many different conditions and factors that can cause a rash.     Follow these instructions at home:  Pay attention to any changes in your symptoms. Follow these instructions to help with your condition:    Medicine    Take or apply over-the-counter and prescription medicines only as told by your health care provider. These may include:    Corticosteroid cream.  Anti-itch lotions.  Oral antihistamines.    Skin Care    Apply cool compresses to the affected areas.  Try taking a bath with:  Epsom salts. Follow the instructions on the packaging. You can get these at your local pharmacy or grocery store.  Baking soda. Pour a small amount into the bath as told by your health care provider.  Colloidal oatmeal. Follow the instructions on the packaging. You can get this at your local pharmacy or grocery store.  Try applying baking soda paste to your skin. Stir water into baking soda until it reaches a paste-like consistency.  Do not scratch or rub your skin.  Avoid covering the rash. Make sure the rash is exposed to air as much as possible.    General instructions    Avoid hot showers or baths, which can make itching worse. A cold shower may help.  Avoid scented soaps, detergents, and perfumes. Use gentle soaps, detergents, perfumes, and other cosmetic products.  Avoid any substance that causes your rash. Keep a journal to help track what causes your rash. Write down:  What you eat.  What cosmetic products you use.  What you drink.  What you wear. This includes jewelry.  Keep all follow-up visits as told by your health care provider. This is important.    Contact a health care provider if:  You sweat at night.  You lose weight.  You urinate more than normal.  You feel weak.  You vomit.  Your skin or the whites of your eyes look yellow (jaundice).  Your skin:  Tingles.  Is numb.  Your rash:  Does not go away after several days.  Gets worse.  You are:  Unusually thirsty.  More tired than normal.  You have:  New symptoms.  Pain in your abdomen.  A fever.  Diarrhea.    Get help right away if:  You develop a rash that covers all or most of your body. The rash may or may not be painful.  You develop blisters that:  Are on top of the rash.  Grow larger or grow together.  Are painful.  Are inside your nose or mouth.  You develop a rash that:  Looks like purple pinprick-sized spots all over your body.  Has a “bull's eye” or looks like a target.  Is not related to sun exposure, is red and painful, and causes your skin to peel.    ADDITIONAL NOTES AND INSTRUCTIONS    Please follow up with your Primary MD in 24-48 hr.  Seek immediate medical care for any new/worsening signs or symptoms.     Document Released: 12/8/2003 Document Revised: 5/23/2017 Document Reviewed: 5/4/2016  [employer] Interactive Patient Education ©2019 [employer] This information is not intended to replace advice given to you by your health care provider. Make sure you discuss any questions you have with your health care provider. No

## 2024-06-03 NOTE — ED ADULT NURSE REASSESSMENT NOTE - CONDITION
Gave report to EDDIE Casarez Patient comfortable. Vital signs documented. HR ranges from 48 to 63. MAR are aware. Will continue to monitor.

## 2024-06-03 NOTE — PHYSICAL THERAPY INITIAL EVALUATION ADULT - GENERAL OBSERVATIONS, REHAB EVAL
Upon entry, pt semi-supine in bed in NAD; + telemetry. HR 62 bpm. Pt left as received with all tubes/lines intact, call bell in reach and in NAD.

## 2024-06-03 NOTE — H&P ADULT - NSHPPHYSICALEXAM_GEN_ALL_CORE
T(C): 36.6 (06-03-24 @ 06:50), Max: 36.9 (06-02-24 @ 21:23)  HR: 62 (06-03-24 @ 06:50) (56 - 78)  BP: 125/63 (06-03-24 @ 06:50) (120/73 - 143/93)  RR: 18 (06-03-24 @ 06:50) (16 - 189)  SpO2: 99% (06-03-24 @ 06:50) (99% - 100%)    CONSTITUTIONAL: Well groomed, no apparent distress  EYES: PERRLA and symmetric, EOMI, No conjunctival or scleral injection, non-icteric  ENMT: Oral mucosa with moist membranes. Normal dentition; no pharyngeal injection or exudates             NECK: Supple, symmetric and without tracheal deviation   RESP: No respiratory distress, no use of accessory muscles; CTA b/l, no WRR  CV: RRR, +S1S2, no MRG; no JVD; no peripheral edema  GI: Soft, NT, ND, no rebound, no guarding; no palpable masses; no hepatosplenomegaly; no hernia palpated  LYMPH: No cervical LAD or tenderness; no axillary LAD or tenderness; no inguinal LAD or tenderness  MSK: Normal gait; No digital clubbing or cyanosis; examination of the (head/neck/spine/ribs/pelvis, RUE, LUE, RLE, LLE) without misalignment,            Normal ROM without pain, no spinal tenderness, normal muscle strength/tone  SKIN: No rashes or ulcers noted; no subcutaneous nodules or induration palpable  NEURO: CN II-XII intact; normal reflexes in upper and lower extremities, sensation intact in upper and lower extremities b/l to light touch   PSYCH: Appropriate insight/judgment; A+O x 3, mood and affect appropriate, recent/remote memory intact T(C): 36.6 (06-03-24 @ 06:50), Max: 36.9 (06-02-24 @ 21:23)  HR: 62 (06-03-24 @ 06:50) (56 - 78)  BP: 125/63 (06-03-24 @ 06:50) (120/73 - 143/93)  RR: 18 (06-03-24 @ 06:50) (16 - 189)  SpO2: 99% (06-03-24 @ 06:50) (99% - 100%)    CONSTITUTIONAL: Well groomed, no apparent distress  EYES: PERRLA and symmetric, EOMI, No conjunctival or scleral injection, non-icteric  ENMT: Oral mucosa with moist membranes. Normal dentition; no pharyngeal injection or exudates  RESP: No respiratory distress, no use of accessory muscles; CTA b/l, no WRR  CV: RRR, +S1S2, no MRG; no JVD; no peripheral edema  GI: nondistended, tenderness to palpation in LUQ, robertson sign positive; , no rebound, no guarding; no palpable masses; no hepatosplenomegaly; no hernia palpated  LYMPH: No cervical LAD or tenderness; no axillary LAD or tenderness; no inguinal LAD or tenderness  MSK: No digital clubbing or cyanosis; examination of the (head/neck/spine/ribs/pelvis, RUE, LUE, RLE, LLE) without misalignment,   SKIN: No rashes or ulcers noted; no subcutaneous nodules or induration palpable  NEURO: CN II-XII intact; normal reflexes in upper and lower extremities, sensation intact in upper and lower extremities b/l to light touch   PSYCH: Appropriate insight/judgment; A+O x 3, mood and affect appropriate, recent/remote memory intact

## 2024-06-03 NOTE — PHYSICAL THERAPY INITIAL EVALUATION ADULT - PERTINENT HX OF CURRENT PROBLEM, REHAB EVAL
Pt is a 58 year old female presenting with abdominal pain and shortness of breath. Of note, patient with multiple ED visits for abdominal pain; found to have solid echogenic mass that was described as cystic in right adnexal region and was told to follow-up with outpatient Gyn, however, has been unable to. CTA chest negative for PE. RUQ US abdomen revealed cholelithiasis and hepatic steatosis. CT A/P significant for complex right adnexal lesion which may represent a subserosal fibroid or ovarian lesion. US transvaginal revealed echogenic right ovarian lesion has a sonographic appearance suggesting a dermoid cyst. Pt admitted for abdominal pain and adnexal lesion; awaiting HIDA scan to rule out acute cholecystitis and MRI pelvis.

## 2024-06-03 NOTE — H&P ADULT - PROBLEM SELECTOR PLAN 5
Dvt ppx: Will hold for possible procedure, Lovenox 40mg qd once cleared  Diet: NPO until cleared by discharge, CC diet (no pork or beed) once cleared  Dispo: Pending PT consult

## 2024-06-03 NOTE — PHYSICAL THERAPY INITIAL EVALUATION ADULT - MANUAL MUSCLE TESTING RESULTS, REHAB EVAL
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

## 2024-06-03 NOTE — PATIENT PROFILE ADULT - NSTRANSFERBELONGINGSRESP_GEN_A_NUR
CHIEF COMPLAINT:  low back and leg pain    HISTORY OF PRESENT ILLNESS:  The patient has significant low back pain and leg pain. The patient is recommended to have lumbar interventional pain procedures. [facility]      PAST MEDICAL HISTORY: reviewed and documented in chart.   SOCIAL HISTORY: reviewed and documented in chart.   ALLERGIES: reviewed and documented in chart.   CURRENT MEDICATIONS: reviewed and documented in chart.   FAMILY HISTORY: reviewed and documented in chart.   REVIEW OF SYSTEMS: A 14-system review was negative except for problem mentioned above.     PHYSICAL EXAM:   VITALS: See nursing note; I have personally reviewed patient's OR holding area vital signs.   GENERAL: Patient is alert and oriented and in no obvious distress  PSYCH: Patient’s mood is appropriate with normal affect.   HEEN: eyes, nose and ears are without discharge.   LUNGS: non labored breathing, good chest expansion.  HEART: extremities warm and well perfused  ABDOMEN: soft and non-distended.  LUMBAR SPINE: Tenderness      ASSESSMENT:  lumbar radiculopathy    PLAN:  1. Bilateral L4-5 and L5-S1 transforaminal epidural steroid injection  2. Risks and benefits explained to patient. She has capacity to consent to the procedure. Consent obtained.  3. Re-evaluation in two weeks in clinic    yes

## 2024-06-03 NOTE — H&P ADULT - PROBLEM SELECTOR PLAN 2
CT A/P: A 4.3 cm complex right adnexal lesion which may represent a subserosal fibroid or ovarian lesion. Ultrasound correlation is recommended.  -MRI pending to further assess   -Will hold of on Gyn consult until MRI results come back

## 2024-06-03 NOTE — ED ADULT NURSE REASSESSMENT NOTE - NS ED NURSE REASSESS COMMENT FT1
Break RN: patient resting comfortably in stretcher, breathing even and unlabored. Offers no complaints at this time. Instructed to call for assistance. Stretcher in lowest position, wheels locked, appropriate side rails in place, call bell in reach. Pending dispo

## 2024-06-03 NOTE — ED ADULT NURSE REASSESSMENT NOTE - CONDITION
Patient c/o chest pain and shortness of breath. Vital signs taken. MD attending and resident at bedside. Appears well. NAD noted. will continue to monitor.

## 2024-06-04 LAB
ANION GAP SERPL CALC-SCNC: 17 MMOL/L — HIGH (ref 7–14)
APPEARANCE UR: CLEAR — SIGNIFICANT CHANGE UP
BACTERIA # UR AUTO: NEGATIVE /HPF — SIGNIFICANT CHANGE UP
BILIRUB UR-MCNC: NEGATIVE — SIGNIFICANT CHANGE UP
BUN SERPL-MCNC: 8 MG/DL — SIGNIFICANT CHANGE UP (ref 7–23)
CALCIUM SERPL-MCNC: 9.4 MG/DL — SIGNIFICANT CHANGE UP (ref 8.4–10.5)
CAST: 0 /LPF — SIGNIFICANT CHANGE UP (ref 0–4)
CHLORIDE SERPL-SCNC: 104 MMOL/L — SIGNIFICANT CHANGE UP (ref 98–107)
CO2 SERPL-SCNC: 16 MMOL/L — LOW (ref 22–31)
COLOR SPEC: YELLOW — SIGNIFICANT CHANGE UP
CREAT SERPL-MCNC: 0.5 MG/DL — SIGNIFICANT CHANGE UP (ref 0.5–1.3)
DIFF PNL FLD: NEGATIVE — SIGNIFICANT CHANGE UP
EGFR: 109 ML/MIN/1.73M2 — SIGNIFICANT CHANGE UP
GLUCOSE BLDC GLUCOMTR-MCNC: 108 MG/DL — HIGH (ref 70–99)
GLUCOSE BLDC GLUCOMTR-MCNC: 115 MG/DL — HIGH (ref 70–99)
GLUCOSE BLDC GLUCOMTR-MCNC: 133 MG/DL — HIGH (ref 70–99)
GLUCOSE BLDC GLUCOMTR-MCNC: 147 MG/DL — HIGH (ref 70–99)
GLUCOSE SERPL-MCNC: 102 MG/DL — HIGH (ref 70–99)
GLUCOSE UR QL: NEGATIVE MG/DL — SIGNIFICANT CHANGE UP
HCT VFR BLD CALC: 38.5 % — SIGNIFICANT CHANGE UP (ref 34.5–45)
HGB BLD-MCNC: 11.2 G/DL — LOW (ref 11.5–15.5)
KETONES UR-MCNC: NEGATIVE MG/DL — SIGNIFICANT CHANGE UP
LEUKOCYTE ESTERASE UR-ACNC: NEGATIVE — SIGNIFICANT CHANGE UP
MAGNESIUM SERPL-MCNC: 2.3 MG/DL — SIGNIFICANT CHANGE UP (ref 1.6–2.6)
MCHC RBC-ENTMCNC: 22.3 PG — LOW (ref 27–34)
MCHC RBC-ENTMCNC: 29.1 GM/DL — LOW (ref 32–36)
MCV RBC AUTO: 76.5 FL — LOW (ref 80–100)
NITRITE UR-MCNC: NEGATIVE — SIGNIFICANT CHANGE UP
NRBC # BLD: 0 /100 WBCS — SIGNIFICANT CHANGE UP (ref 0–0)
NRBC # FLD: 0 K/UL — SIGNIFICANT CHANGE UP (ref 0–0)
PH UR: 7.5 — SIGNIFICANT CHANGE UP (ref 5–8)
PHOSPHATE SERPL-MCNC: 4.6 MG/DL — HIGH (ref 2.5–4.5)
PLATELET # BLD AUTO: 380 K/UL — SIGNIFICANT CHANGE UP (ref 150–400)
POTASSIUM SERPL-MCNC: 4.9 MMOL/L — SIGNIFICANT CHANGE UP (ref 3.5–5.3)
POTASSIUM SERPL-SCNC: 4.9 MMOL/L — SIGNIFICANT CHANGE UP (ref 3.5–5.3)
PROT UR-MCNC: NEGATIVE MG/DL — SIGNIFICANT CHANGE UP
RBC # BLD: 5.03 M/UL — SIGNIFICANT CHANGE UP (ref 3.8–5.2)
RBC # FLD: 15.9 % — HIGH (ref 10.3–14.5)
RBC CASTS # UR COMP ASSIST: 0 /HPF — SIGNIFICANT CHANGE UP (ref 0–4)
SODIUM SERPL-SCNC: 137 MMOL/L — SIGNIFICANT CHANGE UP (ref 135–145)
SP GR SPEC: 1.01 — SIGNIFICANT CHANGE UP (ref 1–1.03)
SQUAMOUS # UR AUTO: 10 /HPF — HIGH (ref 0–5)
UROBILINOGEN FLD QL: 0.2 MG/DL — SIGNIFICANT CHANGE UP (ref 0.2–1)
WBC # BLD: 10.54 K/UL — HIGH (ref 3.8–10.5)
WBC # FLD AUTO: 10.54 K/UL — HIGH (ref 3.8–10.5)
WBC UR QL: 3 /HPF — SIGNIFICANT CHANGE UP (ref 0–5)

## 2024-06-04 PROCEDURE — 99233 SBSQ HOSP IP/OBS HIGH 50: CPT | Mod: GC

## 2024-06-04 RX ORDER — POLYETHYLENE GLYCOL 3350 17 G/17G
17 POWDER, FOR SOLUTION ORAL DAILY
Refills: 0 | Status: DISCONTINUED | OUTPATIENT
Start: 2024-06-04 | End: 2024-06-08

## 2024-06-04 RX ORDER — SENNA PLUS 8.6 MG/1
1 TABLET ORAL AT BEDTIME
Refills: 0 | Status: DISCONTINUED | OUTPATIENT
Start: 2024-06-04 | End: 2024-06-08

## 2024-06-04 RX ORDER — PANTOPRAZOLE SODIUM 20 MG/1
40 TABLET, DELAYED RELEASE ORAL
Refills: 0 | Status: DISCONTINUED | OUTPATIENT
Start: 2024-06-04 | End: 2024-06-05

## 2024-06-04 RX ORDER — OXYCODONE HYDROCHLORIDE 5 MG/1
2.5 TABLET ORAL EVERY 6 HOURS
Refills: 0 | Status: DISCONTINUED | OUTPATIENT
Start: 2024-06-04 | End: 2024-06-05

## 2024-06-04 RX ADMIN — SENNA PLUS 1 TABLET(S): 8.6 TABLET ORAL at 22:56

## 2024-06-04 RX ADMIN — OXYCODONE HYDROCHLORIDE 2.5 MILLIGRAM(S): 5 TABLET ORAL at 17:59

## 2024-06-04 RX ADMIN — POLYETHYLENE GLYCOL 3350 17 GRAM(S): 17 POWDER, FOR SOLUTION ORAL at 11:53

## 2024-06-04 RX ADMIN — Medication 30 MILLILITER(S): at 11:54

## 2024-06-04 RX ADMIN — LOSARTAN POTASSIUM 25 MILLIGRAM(S): 100 TABLET, FILM COATED ORAL at 05:43

## 2024-06-04 RX ADMIN — OXYCODONE HYDROCHLORIDE 2.5 MILLIGRAM(S): 5 TABLET ORAL at 18:30

## 2024-06-04 RX ADMIN — PANTOPRAZOLE SODIUM 40 MILLIGRAM(S): 20 TABLET, DELAYED RELEASE ORAL at 11:53

## 2024-06-04 RX ADMIN — AMLODIPINE BESYLATE 5 MILLIGRAM(S): 2.5 TABLET ORAL at 05:44

## 2024-06-04 NOTE — PROGRESS NOTE ADULT - SUBJECTIVE AND OBJECTIVE BOX
***********************************************  Tigre Lagunas MD  Internal Medicine   PGY1  ***********************************************      PROGRESS NOTE:     Patient is a 58y old  Female who presents with a chief complaint of Abdominal pain (03 Jun 2024 12:37)      SUBJECTIVE / OVERNIGHT EVENTS:    OVERNIGHT:       Pt seen in AM at bedside, resting comfortably in bed, and does not appear to be in any acute distress. When asked, pt denies any recent or active fever, chills, nausea, vomiting, headache, acute sob, chest pain, abdominal pain, genitourinary sx, extremity pain or swelling.          MEDICATIONS  (STANDING):  amLODIPine   Tablet 5 milliGRAM(s) Oral daily  dextrose 50% Injectable 25 Gram(s) IV Push once  dextrose 50% Injectable 12.5 Gram(s) IV Push once  dextrose 50% Injectable 25 Gram(s) IV Push once  dextrose Oral Gel 15 Gram(s) Oral once  enoxaparin Injectable 40 milliGRAM(s) SubCutaneous every 24 hours  glucagon  Injectable 1 milliGRAM(s) IntraMuscular once  insulin lispro (ADMELOG) corrective regimen sliding scale   SubCutaneous every 6 hours  lactated ringers. 1000 milliLiter(s) (100 mL/Hr) IV Continuous <Continuous>  losartan 25 milliGRAM(s) Oral daily  pantoprazole  Injectable 40 milliGRAM(s) IV Push daily    MEDICATIONS  (PRN):      CAPILLARY BLOOD GLUCOSE      POCT Blood Glucose.: 116 mg/dL (03 Jun 2024 23:54)  POCT Blood Glucose.: 141 mg/dL (03 Jun 2024 19:10)  POCT Blood Glucose.: 118 mg/dL (03 Jun 2024 12:12)    I&O's Summary      PHYSICAL EXAM:  Vital Signs Last 24 Hrs  T(C): 36.7 (04 Jun 2024 05:50), Max: 36.9 (03 Jun 2024 21:25)  T(F): 98 (04 Jun 2024 05:50), Max: 98.4 (03 Jun 2024 21:25)  HR: 57 (04 Jun 2024 05:50) (57 - 92)  BP: 125/83 (04 Jun 2024 05:50) (125/83 - 138/72)  BP(mean): --  RR: 18 (04 Jun 2024 05:50) (17 - 18)  SpO2: 99% (04 Jun 2024 05:50) (96% - 99%)    Parameters below as of 04 Jun 2024 05:50  Patient On (Oxygen Delivery Method): room air        CONSTITUTIONAL: NAD; well-developed  HEENT: PERRL, clear conjunctiva  RESPIRATORY: Normal respiratory effort; lungs are clear to auscultation bilaterally; No Crackles/Rhonchi/Wheezing  CARDIOVASCULAR: Regular rate and rhythm, normal S1 and S2, no murmur/rub/gallop; No lower extremity edema; Peripheral pulses are 2+ bilaterally  ABDOMEN: Nontender to palpation, normoactive bowel sounds, no rebound/guarding; No hepatosplenomegaly  MUSCULOSKELETAL: no clubbing or cyanosis of digits; no joint swelling or tenderness to palpation  EXTREMITY: Lower extremities Non-tender to palpation; non-erythematous B/L  NEURO: A&Ox3; no focal deficits   PSYCH: normal mood; Affect appropirate    LABS:                        11.2   10.54 )-----------( 380      ( 04 Jun 2024 05:45 )             38.5     06-04    137  |  104  |  8   ----------------------------<  102<H>  4.9   |  16<L>  |  0.50    Ca    9.4      04 Jun 2024 05:45  Phos  4.6     06-04  Mg     2.30     06-04    TPro  7.5  /  Alb  4.1  /  TBili  0.3  /  DBili  x   /  AST  15  /  ALT  14  /  AlkPhos  87  06-03          Urinalysis Basic - ( 04 Jun 2024 05:45 )    Color: x / Appearance: x / SG: x / pH: x  Gluc: 102 mg/dL / Ketone: x  / Bili: x / Urobili: x   Blood: x / Protein: x / Nitrite: x   Leuk Esterase: x / RBC: x / WBC x   Sq Epi: x / Non Sq Epi: x / Bacteria: x        Culture - Urine (collected 02 Jun 2024 23:30)  Source: Clean Catch Clean Catch (Midstream)  Final Report (03 Jun 2024 23:30):    >=3 organisms. Probable collection contamination.        RADIOLOGY & ADDITIONAL TESTS:  Results Reviewed:   Imaging Personally Reviewed:  Electrocardiogram Personally Reviewed:    COORDINATION OF CARE:  Care Discussed with Consultants/Other Providers [Y/N]:  Prior or Outpatient Records Reviewed [Y/N]:

## 2024-06-04 NOTE — PROGRESS NOTE ADULT - ASSESSMENT
59 yo F w/ PMHx of DM, HTN, cholelithiasis, complex cystic right adnexal mass no PSHx presents to th ED with abdominal pain and shortness of breath admitted to medicine for further w/u. In the ED, underwent CT A/P showed complex R adnexal lesion and cholelithiais. RUQ US showed cholelithasis, layer gallbladder sludge, CBD 3mm. Normal WBC, T bili, LFTs. Surgery consulted for r/o acute sami.     Recommendations:   - HIDA -, current abdominal pain is likely caused by complex adnexal mass  - Appreciate gyn/onc plan  - Patient can follow up outpatient with Acute care Surgery clinic to for elective cholecystectomy  - surgery to sign off, call back with questions or concerns    B Team Surgery   [id number]

## 2024-06-04 NOTE — PROGRESS NOTE ADULT - SUBJECTIVE AND OBJECTIVE BOX
Overnight events:   - No acute events    SUBJECTIVE:  Patient was seen and examined on AM rounds. Denies new complaints. Reports ongoing bilateral lower quadrant and right flank abdominal pain.    OBJECTIVE:  Vital Signs Last 24 Hrs  T(C): 36.7 (04 Jun 2024 05:50), Max: 36.9 (03 Jun 2024 21:25)  T(F): 98 (04 Jun 2024 05:50), Max: 98.4 (03 Jun 2024 21:25)  HR: 57 (04 Jun 2024 05:50) (57 - 92)  BP: 125/83 (04 Jun 2024 05:50) (125/83 - 138/72)  RR: 18 (04 Jun 2024 05:50) (17 - 18)  SpO2: 99% (04 Jun 2024 05:50) (96% - 99%)    Parameters below as of 04 Jun 2024 05:50  Patient On (Oxygen Delivery Method): room air      Physical Examination:  General: No acute distress  Respiratory: Nonlabored  Cardiovascular: RRR  Abdominal: Soft, nondistended, tender to palpation in R hemiabdomen. No rebound or guarding. No organomegaly, no palpable mass.  Extremities: Warm        LABS:                        11.2   10.54 )-----------( 380      ( 04 Jun 2024 05:45 )             38.5       06-04    137  |  104  |  8   ----------------------------<  102<H>  4.9   |  16<L>  |  0.50    Ca    9.4      04 Jun 2024 05:45  Phos  4.6     06-04  Mg     2.30     06-04    TPro  7.5  /  Alb  4.1  /  TBili  0.3  /  DBili  x   /  AST  15  /  ALT  14  /  AlkPhos  87  06-03

## 2024-06-05 ENCOUNTER — RESULT REVIEW (OUTPATIENT)
Age: 59
End: 2024-06-05

## 2024-06-05 LAB
ALBUMIN SERPL ELPH-MCNC: 4 G/DL — SIGNIFICANT CHANGE UP (ref 3.3–5)
ALP SERPL-CCNC: 86 U/L — SIGNIFICANT CHANGE UP (ref 40–120)
ALT FLD-CCNC: 12 U/L — SIGNIFICANT CHANGE UP (ref 4–33)
ANION GAP SERPL CALC-SCNC: 15 MMOL/L — HIGH (ref 7–14)
AST SERPL-CCNC: 13 U/L — SIGNIFICANT CHANGE UP (ref 4–32)
BASOPHILS # BLD AUTO: 0.02 K/UL — SIGNIFICANT CHANGE UP (ref 0–0.2)
BASOPHILS NFR BLD AUTO: 0.2 % — SIGNIFICANT CHANGE UP (ref 0–2)
BILIRUB SERPL-MCNC: 0.4 MG/DL — SIGNIFICANT CHANGE UP (ref 0.2–1.2)
BUN SERPL-MCNC: 11 MG/DL — SIGNIFICANT CHANGE UP (ref 7–23)
CALCIUM SERPL-MCNC: 9.4 MG/DL — SIGNIFICANT CHANGE UP (ref 8.4–10.5)
CHLORIDE SERPL-SCNC: 101 MMOL/L — SIGNIFICANT CHANGE UP (ref 98–107)
CO2 SERPL-SCNC: 23 MMOL/L — SIGNIFICANT CHANGE UP (ref 22–31)
CREAT SERPL-MCNC: 0.57 MG/DL — SIGNIFICANT CHANGE UP (ref 0.5–1.3)
EGFR: 105 ML/MIN/1.73M2 — SIGNIFICANT CHANGE UP
EOSINOPHIL # BLD AUTO: 0.11 K/UL — SIGNIFICANT CHANGE UP (ref 0–0.5)
EOSINOPHIL NFR BLD AUTO: 1.3 % — SIGNIFICANT CHANGE UP (ref 0–6)
GLUCOSE BLDC GLUCOMTR-MCNC: 114 MG/DL — HIGH (ref 70–99)
GLUCOSE BLDC GLUCOMTR-MCNC: 118 MG/DL — HIGH (ref 70–99)
GLUCOSE BLDC GLUCOMTR-MCNC: 134 MG/DL — HIGH (ref 70–99)
GLUCOSE BLDC GLUCOMTR-MCNC: 159 MG/DL — HIGH (ref 70–99)
GLUCOSE SERPL-MCNC: 125 MG/DL — HIGH (ref 70–99)
HCT VFR BLD CALC: 38.4 % — SIGNIFICANT CHANGE UP (ref 34.5–45)
HGB BLD-MCNC: 12.1 G/DL — SIGNIFICANT CHANGE UP (ref 11.5–15.5)
IANC: 5.22 K/UL — SIGNIFICANT CHANGE UP (ref 1.8–7.4)
IMM GRANULOCYTES NFR BLD AUTO: 0.2 % — SIGNIFICANT CHANGE UP (ref 0–0.9)
LYMPHOCYTES # BLD AUTO: 2.63 K/UL — SIGNIFICANT CHANGE UP (ref 1–3.3)
LYMPHOCYTES # BLD AUTO: 31.4 % — SIGNIFICANT CHANGE UP (ref 13–44)
MAGNESIUM SERPL-MCNC: 2.4 MG/DL — SIGNIFICANT CHANGE UP (ref 1.6–2.6)
MCHC RBC-ENTMCNC: 23.2 PG — LOW (ref 27–34)
MCHC RBC-ENTMCNC: 31.5 GM/DL — LOW (ref 32–36)
MCV RBC AUTO: 73.6 FL — LOW (ref 80–100)
MONOCYTES # BLD AUTO: 0.38 K/UL — SIGNIFICANT CHANGE UP (ref 0–0.9)
MONOCYTES NFR BLD AUTO: 4.5 % — SIGNIFICANT CHANGE UP (ref 2–14)
NEUTROPHILS # BLD AUTO: 5.22 K/UL — SIGNIFICANT CHANGE UP (ref 1.8–7.4)
NEUTROPHILS NFR BLD AUTO: 62.4 % — SIGNIFICANT CHANGE UP (ref 43–77)
NRBC # BLD: 0 /100 WBCS — SIGNIFICANT CHANGE UP (ref 0–0)
NRBC # FLD: 0 K/UL — SIGNIFICANT CHANGE UP (ref 0–0)
PHOSPHATE SERPL-MCNC: 4.2 MG/DL — SIGNIFICANT CHANGE UP (ref 2.5–4.5)
PLATELET # BLD AUTO: 386 K/UL — SIGNIFICANT CHANGE UP (ref 150–400)
POTASSIUM SERPL-MCNC: 4 MMOL/L — SIGNIFICANT CHANGE UP (ref 3.5–5.3)
POTASSIUM SERPL-SCNC: 4 MMOL/L — SIGNIFICANT CHANGE UP (ref 3.5–5.3)
PROT SERPL-MCNC: 7.6 G/DL — SIGNIFICANT CHANGE UP (ref 6–8.3)
RBC # BLD: 5.22 M/UL — HIGH (ref 3.8–5.2)
RBC # FLD: 16 % — HIGH (ref 10.3–14.5)
SODIUM SERPL-SCNC: 139 MMOL/L — SIGNIFICANT CHANGE UP (ref 135–145)
WBC # BLD: 8.38 K/UL — SIGNIFICANT CHANGE UP (ref 3.8–10.5)
WBC # FLD AUTO: 8.38 K/UL — SIGNIFICANT CHANGE UP (ref 3.8–10.5)

## 2024-06-05 PROCEDURE — 93010 ELECTROCARDIOGRAM REPORT: CPT

## 2024-06-05 PROCEDURE — 99222 1ST HOSP IP/OBS MODERATE 55: CPT

## 2024-06-05 PROCEDURE — 99233 SBSQ HOSP IP/OBS HIGH 50: CPT | Mod: GC

## 2024-06-05 PROCEDURE — 93306 TTE W/DOPPLER COMPLETE: CPT | Mod: 26

## 2024-06-05 RX ORDER — CLARITHROMYCIN 500 MG
500 TABLET ORAL
Refills: 0 | Status: DISCONTINUED | OUTPATIENT
Start: 2024-06-05 | End: 2024-06-08

## 2024-06-05 RX ORDER — HYDROMORPHONE HYDROCHLORIDE 2 MG/ML
0.2 INJECTION INTRAMUSCULAR; INTRAVENOUS; SUBCUTANEOUS EVERY 6 HOURS
Refills: 0 | Status: DISCONTINUED | OUTPATIENT
Start: 2024-06-05 | End: 2024-06-08

## 2024-06-05 RX ORDER — LANOLIN ALCOHOL/MO/W.PET/CERES
3 CREAM (GRAM) TOPICAL AT BEDTIME
Refills: 0 | Status: DISCONTINUED | OUTPATIENT
Start: 2024-06-05 | End: 2024-06-08

## 2024-06-05 RX ORDER — AMOXICILLIN 250 MG/5ML
1000 SUSPENSION, RECONSTITUTED, ORAL (ML) ORAL
Refills: 0 | Status: DISCONTINUED | OUTPATIENT
Start: 2024-06-05 | End: 2024-06-08

## 2024-06-05 RX ORDER — PANTOPRAZOLE SODIUM 20 MG/1
40 TABLET, DELAYED RELEASE ORAL
Refills: 0 | Status: DISCONTINUED | OUTPATIENT
Start: 2024-06-05 | End: 2024-06-08

## 2024-06-05 RX ORDER — SODIUM CHLORIDE 9 MG/ML
1000 INJECTION, SOLUTION INTRAVENOUS
Refills: 0 | Status: DISCONTINUED | OUTPATIENT
Start: 2024-06-05 | End: 2024-06-06

## 2024-06-05 RX ADMIN — SODIUM CHLORIDE 100 MILLILITER(S): 9 INJECTION, SOLUTION INTRAVENOUS at 11:52

## 2024-06-05 RX ADMIN — AMLODIPINE BESYLATE 5 MILLIGRAM(S): 2.5 TABLET ORAL at 06:14

## 2024-06-05 RX ADMIN — Medication 1: at 17:35

## 2024-06-05 RX ADMIN — Medication 1000 MILLIGRAM(S): at 17:35

## 2024-06-05 RX ADMIN — Medication 500 MILLIGRAM(S): at 13:08

## 2024-06-05 RX ADMIN — PANTOPRAZOLE SODIUM 40 MILLIGRAM(S): 20 TABLET, DELAYED RELEASE ORAL at 06:14

## 2024-06-05 RX ADMIN — SENNA PLUS 1 TABLET(S): 8.6 TABLET ORAL at 21:21

## 2024-06-05 RX ADMIN — Medication 30 MILLILITER(S): at 01:44

## 2024-06-05 RX ADMIN — PANTOPRAZOLE SODIUM 40 MILLIGRAM(S): 20 TABLET, DELAYED RELEASE ORAL at 17:35

## 2024-06-05 RX ADMIN — LOSARTAN POTASSIUM 25 MILLIGRAM(S): 100 TABLET, FILM COATED ORAL at 06:14

## 2024-06-05 RX ADMIN — Medication 500 MILLIGRAM(S): at 17:34

## 2024-06-05 RX ADMIN — Medication 1000 MILLIGRAM(S): at 11:51

## 2024-06-05 NOTE — PROGRESS NOTE ADULT - ASSESSMENT
58-year-old woman with history of diabetes, hypertension, hyperlipidemia, cholelithiasis, complex cystic right adnexal mass presents to the ED with abdominal pain and shortness of breath found to have known adnexal mass and concerns for biliary colic vs cholecystitis admitted for further work-up and management. HIDA negative for acute choley, no surgical intervention. MRI showed Right adnexal lipid-containing mass, unclear if cause of abdominal pain. Gyn consulted, pending further recs. R/o atypical ACS, EKG and TTE pending.

## 2024-06-05 NOTE — CONSULT NOTE ADULT - NS ATTEND AMEND GEN_ALL_CORE FT
Pt with mostly generalized abdominal pain RUQ>lower abdomen, findings of right adnexal mass w/o ascites or lymphadenopathy, constipations and cholelithiasis.  Exam reveals inconsistent but diffuse tenderness, no rebound or guarding.  I spoke with pt and her son at the bedside.  We advise tumor markers, management of constipation and f/up with gyn clinic for preop working including Embx and pap and plan for at minimum RSO.  Pt and son expressed understanding of above.

## 2024-06-05 NOTE — PROGRESS NOTE ADULT - PROBLEM SELECTOR PLAN 1
P/w consistent and severe abdominal pain, more so worse in upper quadrant region. Rodarte sign positive significantly on exam.   -CT A/P: A 4.3 cm complex right adnexal lesion which may represent a subserosal fibroid or ovarian lesion.   -US abdomen: Cholelithiasis and Hepatic steatosis.  -LFTs wnl  -HIDA scan negative for acute choley  -unclear etiology--> less likely adnexal mass is causing significant abdominal pain given location of pain   -concerns for biliary colic vs gastritis vs atypical ACS?  -no surgical intervention per surgery   -PPI w/ protonix 40mg qd  -infectious w/u sent, f/u Bcx and Ucx   -EKG and TTE ordered to r/o MI

## 2024-06-05 NOTE — PROGRESS NOTE ADULT - SUBJECTIVE AND OBJECTIVE BOX
***********************************************  Tigre Lagunas MD  Internal Medicine   PGY1  ***********************************************      PROGRESS NOTE:     Patient is a 58y old  Female who presents with a chief complaint of Abdominal pain (2024 11:35)      SUBJECTIVE / OVERNIGHT EVENTS:    OVERNIGHT: No overnight events       Pt seen in AM at bedside. Patient appears uncomfortable with significant abdominal pain still.  When asked, pt denies any recent or active fever, chills, nausea, vomiting, headache, acute sob, chest pain, genitourinary sx, extremity pain or swelling.          MEDICATIONS  (STANDING):  amLODIPine   Tablet 5 milliGRAM(s) Oral daily  dextrose 50% Injectable 12.5 Gram(s) IV Push once  dextrose 50% Injectable 25 Gram(s) IV Push once  dextrose 50% Injectable 25 Gram(s) IV Push once  dextrose Oral Gel 15 Gram(s) Oral once  enoxaparin Injectable 40 milliGRAM(s) SubCutaneous every 24 hours  glucagon  Injectable 1 milliGRAM(s) IntraMuscular once  insulin lispro (ADMELOG) corrective regimen sliding scale   SubCutaneous every 6 hours  lactated ringers. 1000 milliLiter(s) (100 mL/Hr) IV Continuous <Continuous>  losartan 25 milliGRAM(s) Oral daily  pantoprazole    Tablet 40 milliGRAM(s) Oral before breakfast  polyethylene glycol 3350 17 Gram(s) Oral daily  senna 1 Tablet(s) Oral at bedtime    MEDICATIONS  (PRN):  aluminum hydroxide/magnesium hydroxide/simethicone Suspension 30 milliLiter(s) Oral every 4 hours PRN Dyspepsia  bisacodyl Suppository 10 milliGRAM(s) Rectal daily PRN Constipation  oxyCODONE    IR 2.5 milliGRAM(s) Oral every 6 hours PRN Severe Pain (7 - 10)      CAPILLARY BLOOD GLUCOSE      POCT Blood Glucose.: 134 mg/dL (2024 06:17)  POCT Blood Glucose.: 147 mg/dL (2024 21:05)  POCT Blood Glucose.: 115 mg/dL (2024 17:12)  POCT Blood Glucose.: 133 mg/dL (2024 12:15)  POCT Blood Glucose.: 108 mg/dL (2024 08:18)    I&O's Summary      PHYSICAL EXAM:  Vital Signs Last 24 Hrs  T(C): 36.7 (2024 05:45), Max: 36.8 (2024 21:46)  T(F): 98 (2024 05:45), Max: 98.2 (2024 21:46)  HR: 75 (2024 05:45) (75 - 94)  BP: 123/77 (2024 05:45) (113/80 - 129/89)  BP(mean): --  RR: 18 (2024 05:45) (18 - 18)  SpO2: 99% (2024 05:45) (97% - 100%)    Parameters below as of 2024 05:45  Patient On (Oxygen Delivery Method): room air        CONSTITUTIONAL: NAD; well-developed  HEENT: PERRL, clear conjunctiva  RESPIRATORY: Normal respiratory effort; lungs are clear to auscultation bilaterally; No Crackles/Rhonchi/Wheezing  CARDIOVASCULAR: Regular rate and rhythm, normal S1 and S2, no murmur/rub/gallop; No lower extremity edema; Peripheral pulses are 2+ bilaterally  ABDOMEN: soft,non distended, tenderness to palpation primarily in RUQ, normoactive bowel sounds, no rebound/guarding; No hepatosplenomegaly  MUSCULOSKELETAL: no clubbing or cyanosis of digits; no joint swelling or tenderness to palpation  EXTREMITY: Lower extremities Non-tender to palpation; non-erythematous B/L  NEURO: A&Ox3; no focal deficits   PSYCH: normal mood; Affect appropirate    LABS:                        11.2   10.54 )-----------( 380      ( 2024 05:45 )             38.5     06-04    137  |  104  |  8   ----------------------------<  102<H>  4.9   |  16<L>  |  0.50    Ca    9.4      2024 05:45  Phos  4.6     06-04  Mg     2.30     06-    TPro  7.5  /  Alb  4.1  /  TBili  0.3  /  DBili  x   /  AST  15  /  ALT  14  /  AlkPhos  87  06-          Urinalysis Basic - ( 2024 15:12 )    Color: Yellow / Appearance: Clear / S.008 / pH: x  Gluc: x / Ketone: Negative mg/dL  / Bili: Negative / Urobili: 0.2 mg/dL   Blood: x / Protein: Negative mg/dL / Nitrite: Negative   Leuk Esterase: Negative / RBC: 0 /HPF / WBC 3 /HPF   Sq Epi: x / Non Sq Epi: 10 /HPF / Bacteria: Negative /HPF        Culture - Urine (collected 2024 23:30)  Source: Clean Catch Clean Catch (Midstream)  Final Report (2024 23:30):    >=3 organisms. Probable collection contamination.        RADIOLOGY & ADDITIONAL TESTS:  Results Reviewed:   Imaging Personally Reviewed:  Electrocardiogram Personally Reviewed:    COORDINATION OF CARE:  Care Discussed with Consultants/Other Providers [Y/N]:  Prior or Outpatient Records Reviewed [Y/N]:

## 2024-06-05 NOTE — CONSULT NOTE ADULT - SUBJECTIVE AND OBJECTIVE BOX
SULMA [place]  58y  Female [id number]    HPI: 57 y/o P3 post menopausal female who originally presented to [facility] ED with abdominal pain for the past 2 months. Patient states she has been seen in the ED at [facility] 5 different times for right upper quadrant pain. She describes the pain as constant but worse after she eats. Patient also describing lower abdominal pain that has been present for the past 4 weeks. She states the pain improves after she has a bowel movement but she has been unable to have regular bowel movements. Her last BM was last week and patient reports having to self-disimpact in order to go to the bathroom. She has had recent colonoscopy and endoscopy which showed gastritis, hemorrhoids and a polyp that was taken for biopsy. She denies current nausea, SOB, chest pain, fevers, chills, vomiting, dysuria. She has never had post menopausal vaginal bleeding.         Name of GYN Physician: [facility]  OBHx: x3   GynHx: Known hx of fibroids and cysts. Denies endometriosis, STI's. States had a pap smear in May of this year but is unsure whether the result was normal or not.   PMH: DM, HLD, HTN  PSH: Denies  Meds: Metformin, Amlodipine, Losartan   Allx: NKDA  Social History:  Denies smoking use, drug use, alcohol use.       Vital Signs Last 24 Hrs  T(C): 36.7 (2024 05:45), Max: 36.8 (2024 21:46)  T(F): 98 (2024 05:45), Max: 98.2 (2024 21:46)  HR: 75 (2024 05:45) (75 - 94)  BP: 123/77 (2024 05:45) (113/80 - 129/89)  BP(mean): --  RR: 18 (2024 05:45) (18 - 18)  SpO2: 99% (2024 05:45) (97% - 100%)    Parameters below as of 2024 05:45  Patient On (Oxygen Delivery Method): room air        Physical Exam:   General: sitting comfortably in bed, NAD   HEENT: neck supple, full ROM  Back: No CVA tenderness  Abd: Soft, non-distended. Generalized abdominal tenderness, +Aubrey sign. RLQ tenderness to deep palpation.    : No bleeding on pad. External labia wnl. Bimanual exam limited due to discomfort. Patient endorsed pain with insertion of finger into vagina. Adnexa non palpable b/l. Cervix closed.   Chaperone: Ada Silveira RN      LABS:                        12.1   8.38  )-----------( 386      ( 2024 05:20 )             38.4     06-    139  |  101  |  11  ----------------------------<  125<H>  4.0   |  23  |  0.57    Ca    9.4      2024 05:20  Phos  4.2     06-05  Mg     2.40     06-05    TPro  7.6  /  Alb  4.0  /  TBili  0.4  /  DBili  x   /  AST  13  /  ALT  12  /  AlkPhos  86  06-05    I&O's Detail      Urinalysis Basic - ( 2024 05:20 )    Color: x / Appearance: x / SG: x / pH: x  Gluc: 125 mg/dL / Ketone: x  / Bili: x / Urobili: x   Blood: x / Protein: x / Nitrite: x   Leuk Esterase: x / RBC: x / WBC x   Sq Epi: x / Non Sq Epi: x / Bacteria: x        RADIOLOGY & ADDITIONAL STUDIES:  < from: MR Pelvis w/ IV Cont (24 @ 21:54) >    ACC: [id number] EXAM:  MR PELVIS IC   [facility]     PROCEDURE DATE:  2024          INTERPRETATION:  CLINICAL INFORMATION: Abdominal pain. Right adnexal   lesion.    COMPARISON: Ultrasound pelvis 6/3/2024. CT abdomen and pelvis6/3/2024.    CONTRAST/COMPLICATIONS:  IV Contrast: Gadavist  7.5 cc administered   0 cc discarded  Oral Contrast: NONE  Complications: None reported at time of study completion    PROCEDURE:  MRI of the pelvis was performed.    FINDINGS:  UTERUS: Measures 8.5 x 4.3 x 5.4 cm. Left anterior subserosal fibroid   measuring 1.4 x 1.2 x 1.1 cm. Left anterior intramural fibroid measuring   0.9 x 0.8 x 0.7 cm. Nabothian cysts. Findings pertaining to the right   adnexal mass is described below.  ENDOMETRIUM: Within normal limits. Measures 3 mm in thickness.  JUNCTIONAL ZONE: Within normal limits.    RIGHT OVARY/ADNEXA: Mass within the right adnexa measuring 4.5 x 4.1 x   4.0 cm that demonstrates heterogeneous T2 hypointensity and T1   intermediate to slightly hyperintense signal relative to myometrium.   There is intralesional signal loss on out of phase imaging, indicative of   the presence of lipid content. This mass is favored to be arising from   the right aspect of the uterus via a stalk (series 6 image 13).    The likely candidate for the right ovary is seen immediately posterior to   and favored to be separate from this mass, measuring 2.4 x 1.5 x 2.9 cm   (series 6 image 8, series 5 image 15). There is an associated simple   appearing ovarian or paraovarian cyst that measures 1.2 x 1.0 x 1.4 cm.    LEFT OVARY/ADNEXA: Left ovary is within normal limits, measuring 2.2 x   1.3 x 3.0 cm.    BLADDER: Within normal limits.    LYMPH NODES: No pelvic lymphadenopathy.    VISUALIZED PORTIONS:    ABDOMINAL ORGANS: Cholelithiasis.  BOWEL: Within normal limits.  PERITONEUM: No ascites.  VESSELS: Within normal limits.  ABDOMINAL WALL: Within normal limits.  BONES: Degenerative changes.    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 Ovarian origin and ovarian dermoid   are thought to be less likely.  *  Additional small uterine fibroids.    --- End of Report ---      BALDEMAR REDDY MD; Attending Radiologist  This document has been electronically signed. 2024 12:00PM    < end of copied text >

## 2024-06-05 NOTE — CONSULT NOTE ADULT - ASSESSMENT
57 y/o P3 post menopausal female who originally presented to [facility] ED with abdominal pain for the past 2 months. GYN consulted for pelvic mass seen on imaging. 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 Physical exam with generalized abdominal tenderness to palpation, primarily in RUQ. Discussed with patient and family member at bedside that abdominal pain is likely multifactorial. Discussed recommendation for outpatient surgical management which will include medical clearance as well as endometrial biopsy in office prior to scheduling procedure on non-emergent basis.     - Recommend tumor markers while inpatient (CA 19-9, CEA, )  - Email sent to coordinate follow up with booking clinic for outpatient surgical planning   - Provide clinic information upon discharge: [facility] [address] [place] [address] ([phone])  - No acute GYN intervention   - Remaining care per primary team    TYLER Townsend  Seen w/ Dr. Wilkerson  
59 yo F w/ PMHx of DM, HTN, cholelithiasis, complex cystic right adnexal mass no PSHx presents to th ED with abdominal pain and shortness of breath admitted to medicine for further w/u. In the ED, underwent CT A/P showed complex R adnexal lesion and cholelithiais. RUQ US showed cholelithasis, layer gallbladder sludge, CBD 3mm. Normal WBC, T bili, LFTs. Surgery consulted for r/o acute sami.     Recommendations:   - Unclear etiology of abdominal pain      - Radiographically no pericholecystic fluid or gallbladder wall thickening. No systemic signs of infection   - Obtain HIDA scan r/o acute sami   - Will follow     Discussed w/ attending     B Team Surgery   [id number]

## 2024-06-05 NOTE — PROGRESS NOTE ADULT - PROBLEM SELECTOR PLAN 4
Home regimen: Amlodipine 5 mg qd and Losartan 25mg qd  -resume home amlodipine and losartan with hold parameters

## 2024-06-05 NOTE — PROGRESS NOTE ADULT - PROBLEM SELECTOR PLAN 2
-CT A/P: A 4.3 cm complex right adnexal lesion which may represent a subserosal fibroid or ovarian lesion. Ultrasound correlation is recommended.  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 Ovarian origin and ovarian dermoid  are thought to be less likely. Additional small uterine fibroids.  -Gyn consult for further assessment if related to abdominal pain

## 2024-06-06 PROBLEM — E78.5 HYPERLIPIDEMIA, UNSPECIFIED: Chronic | Status: ACTIVE | Noted: 2024-06-03

## 2024-06-06 PROBLEM — I10 ESSENTIAL (PRIMARY) HYPERTENSION: Chronic | Status: ACTIVE | Noted: 2024-06-03

## 2024-06-06 PROBLEM — E11.9 TYPE 2 DIABETES MELLITUS WITHOUT COMPLICATIONS: Chronic | Status: ACTIVE | Noted: 2024-06-03

## 2024-06-06 LAB
ANION GAP SERPL CALC-SCNC: 12 MMOL/L — SIGNIFICANT CHANGE UP (ref 7–14)
BUN SERPL-MCNC: 13 MG/DL — SIGNIFICANT CHANGE UP (ref 7–23)
CALCIUM SERPL-MCNC: 9.2 MG/DL — SIGNIFICANT CHANGE UP (ref 8.4–10.5)
CANCER AG125 SERPL-ACNC: 3 U/ML — SIGNIFICANT CHANGE UP
CANCER AG19-9 SERPL-ACNC: 3 U/ML — SIGNIFICANT CHANGE UP
CEA SERPL-MCNC: <1 NG/ML — SIGNIFICANT CHANGE UP (ref 0–3.8)
CHLORIDE SERPL-SCNC: 102 MMOL/L — SIGNIFICANT CHANGE UP (ref 98–107)
CO2 SERPL-SCNC: 23 MMOL/L — SIGNIFICANT CHANGE UP (ref 22–31)
CREAT SERPL-MCNC: 0.56 MG/DL — SIGNIFICANT CHANGE UP (ref 0.5–1.3)
CULTURE RESULTS: SIGNIFICANT CHANGE UP
EGFR: 106 ML/MIN/1.73M2 — SIGNIFICANT CHANGE UP
GLUCOSE BLDC GLUCOMTR-MCNC: 113 MG/DL — HIGH (ref 70–99)
GLUCOSE BLDC GLUCOMTR-MCNC: 113 MG/DL — HIGH (ref 70–99)
GLUCOSE BLDC GLUCOMTR-MCNC: 116 MG/DL — HIGH (ref 70–99)
GLUCOSE BLDC GLUCOMTR-MCNC: 117 MG/DL — HIGH (ref 70–99)
GLUCOSE BLDC GLUCOMTR-MCNC: 164 MG/DL — HIGH (ref 70–99)
GLUCOSE SERPL-MCNC: 105 MG/DL — HIGH (ref 70–99)
HCT VFR BLD CALC: 36.1 % — SIGNIFICANT CHANGE UP (ref 34.5–45)
HGB BLD-MCNC: 11 G/DL — LOW (ref 11.5–15.5)
MAGNESIUM SERPL-MCNC: 2.4 MG/DL — SIGNIFICANT CHANGE UP (ref 1.6–2.6)
MCHC RBC-ENTMCNC: 22.6 PG — LOW (ref 27–34)
MCHC RBC-ENTMCNC: 30.5 GM/DL — LOW (ref 32–36)
MCV RBC AUTO: 74.3 FL — LOW (ref 80–100)
NRBC # BLD: 0 /100 WBCS — SIGNIFICANT CHANGE UP (ref 0–0)
NRBC # FLD: 0 K/UL — SIGNIFICANT CHANGE UP (ref 0–0)
PHOSPHATE SERPL-MCNC: 3.8 MG/DL — SIGNIFICANT CHANGE UP (ref 2.5–4.5)
PLATELET # BLD AUTO: 354 K/UL — SIGNIFICANT CHANGE UP (ref 150–400)
POTASSIUM SERPL-MCNC: 3.9 MMOL/L — SIGNIFICANT CHANGE UP (ref 3.5–5.3)
POTASSIUM SERPL-SCNC: 3.9 MMOL/L — SIGNIFICANT CHANGE UP (ref 3.5–5.3)
RBC # BLD: 4.86 M/UL — SIGNIFICANT CHANGE UP (ref 3.8–5.2)
RBC # FLD: 15.9 % — HIGH (ref 10.3–14.5)
SODIUM SERPL-SCNC: 137 MMOL/L — SIGNIFICANT CHANGE UP (ref 135–145)
SPECIMEN SOURCE: SIGNIFICANT CHANGE UP
WBC # BLD: 8.82 K/UL — SIGNIFICANT CHANGE UP (ref 3.8–10.5)
WBC # FLD AUTO: 8.82 K/UL — SIGNIFICANT CHANGE UP (ref 3.8–10.5)

## 2024-06-06 PROCEDURE — 99233 SBSQ HOSP IP/OBS HIGH 50: CPT | Mod: GC

## 2024-06-06 RX ORDER — SODIUM CHLORIDE 9 MG/ML
1000 INJECTION, SOLUTION INTRAVENOUS ONCE
Refills: 0 | Status: COMPLETED | OUTPATIENT
Start: 2024-06-06 | End: 2024-06-06

## 2024-06-06 RX ORDER — ALPRAZOLAM 0.25 MG
0.25 TABLET ORAL ONCE
Refills: 0 | Status: DISCONTINUED | OUTPATIENT
Start: 2024-06-06 | End: 2024-06-07

## 2024-06-06 RX ORDER — SODIUM CHLORIDE 9 MG/ML
1000 INJECTION, SOLUTION INTRAVENOUS ONCE
Refills: 0 | Status: DISCONTINUED | OUTPATIENT
Start: 2024-06-06 | End: 2024-06-06

## 2024-06-06 RX ORDER — NYSTATIN CREAM 100000 [USP'U]/G
1 CREAM TOPICAL
Refills: 0 | Status: DISCONTINUED | OUTPATIENT
Start: 2024-06-06 | End: 2024-06-08

## 2024-06-06 RX ORDER — SODIUM CHLORIDE 9 MG/ML
1000 INJECTION, SOLUTION INTRAVENOUS
Refills: 0 | Status: DISCONTINUED | OUTPATIENT
Start: 2024-06-06 | End: 2024-06-06

## 2024-06-06 RX ORDER — OXYCODONE HYDROCHLORIDE 5 MG/1
1 TABLET ORAL
Qty: 20 | Refills: 0
Start: 2024-06-06 | End: 2024-06-10

## 2024-06-06 RX ADMIN — PANTOPRAZOLE SODIUM 40 MILLIGRAM(S): 20 TABLET, DELAYED RELEASE ORAL at 06:41

## 2024-06-06 RX ADMIN — Medication 1: at 09:07

## 2024-06-06 RX ADMIN — NYSTATIN CREAM 1 APPLICATION(S): 100000 CREAM TOPICAL at 19:21

## 2024-06-06 RX ADMIN — SENNA PLUS 1 TABLET(S): 8.6 TABLET ORAL at 22:34

## 2024-06-06 RX ADMIN — Medication 3 MILLIGRAM(S): at 22:35

## 2024-06-06 RX ADMIN — SODIUM CHLORIDE 100 MILLILITER(S): 9 INJECTION, SOLUTION INTRAVENOUS at 09:56

## 2024-06-06 RX ADMIN — Medication 30 MILLILITER(S): at 01:20

## 2024-06-06 RX ADMIN — SODIUM CHLORIDE 1000 MILLILITER(S): 9 INJECTION, SOLUTION INTRAVENOUS at 10:35

## 2024-06-06 RX ADMIN — POLYETHYLENE GLYCOL 3350 17 GRAM(S): 17 POWDER, FOR SOLUTION ORAL at 22:34

## 2024-06-06 RX ADMIN — PANTOPRAZOLE SODIUM 40 MILLIGRAM(S): 20 TABLET, DELAYED RELEASE ORAL at 18:02

## 2024-06-06 RX ADMIN — SODIUM CHLORIDE 1000 MILLILITER(S): 9 INJECTION, SOLUTION INTRAVENOUS at 08:47

## 2024-06-06 RX ADMIN — Medication 500 MILLIGRAM(S): at 18:28

## 2024-06-06 RX ADMIN — Medication 1000 MILLIGRAM(S): at 06:43

## 2024-06-06 RX ADMIN — ENOXAPARIN SODIUM 40 MILLIGRAM(S): 100 INJECTION SUBCUTANEOUS at 19:34

## 2024-06-06 RX ADMIN — Medication 500 MILLIGRAM(S): at 06:42

## 2024-06-06 RX ADMIN — Medication 1000 MILLIGRAM(S): at 18:27

## 2024-06-06 NOTE — PROGRESS NOTE ADULT - SUBJECTIVE AND OBJECTIVE BOX
***********************************************  Tigre Lagunas MD  Internal Medicine   PGY1  ***********************************************      PROGRESS NOTE:     Patient is a 58y old  Female who presents with a chief complaint of Abdominal pain (05 Jun 2024 09:44)      SUBJECTIVE / OVERNIGHT EVENTS:    OVERNIGHT: No overnight events       Pt seen in AM at bedside, resting in bed. Abdominal pain is unchanged, still feels uncomfortable and intermittently making her SOB. NO BM. When asked, pt denies any recent or active fever, chills, nausea, vomiting, headache, acute sob, chest pain, abdominal pain, genitourinary sx, extremity pain or swelling.          MEDICATIONS  (STANDING):  amLODIPine   Tablet 5 milliGRAM(s) Oral daily  amoxicillin 1000 milliGRAM(s) Oral two times a day  clarithromycin 500 milliGRAM(s) Oral two times a day  dextrose 50% Injectable 12.5 Gram(s) IV Push once  dextrose 50% Injectable 25 Gram(s) IV Push once  dextrose 50% Injectable 25 Gram(s) IV Push once  dextrose Oral Gel 15 Gram(s) Oral once  enoxaparin Injectable 40 milliGRAM(s) SubCutaneous every 24 hours  glucagon  Injectable 1 milliGRAM(s) IntraMuscular once  insulin lispro (ADMELOG) corrective regimen sliding scale   SubCutaneous every 6 hours  lactated ringers. 1000 milliLiter(s) (100 mL/Hr) IV Continuous <Continuous>  losartan 25 milliGRAM(s) Oral daily  melatonin 3 milliGRAM(s) Oral at bedtime  pantoprazole    Tablet 40 milliGRAM(s) Oral two times a day  polyethylene glycol 3350 17 Gram(s) Oral daily  senna 1 Tablet(s) Oral at bedtime    MEDICATIONS  (PRN):  aluminum hydroxide/magnesium hydroxide/simethicone Suspension 30 milliLiter(s) Oral every 4 hours PRN Dyspepsia  bisacodyl Suppository 10 milliGRAM(s) Rectal daily PRN Constipation  HYDROmorphone  Injectable 0.2 milliGRAM(s) IV Push every 6 hours PRN Severe Pain (7 - 10)      CAPILLARY BLOOD GLUCOSE      POCT Blood Glucose.: 113 mg/dL (06 Jun 2024 00:42)  POCT Blood Glucose.: 118 mg/dL (05 Jun 2024 21:47)  POCT Blood Glucose.: 159 mg/dL (05 Jun 2024 16:57)  POCT Blood Glucose.: 114 mg/dL (05 Jun 2024 12:45)    I&O's Summary    05 Jun 2024 07:01  -  06 Jun 2024 07:00  --------------------------------------------------------  IN: 940 mL / OUT: 0 mL / NET: 940 mL        PHYSICAL EXAM:  Vital Signs Last 24 Hrs  T(C): 36.9 (06 Jun 2024 06:40), Max: 36.9 (05 Jun 2024 13:00)  T(F): 98.4 (06 Jun 2024 06:40), Max: 98.4 (05 Jun 2024 13:00)  HR: 90 (06 Jun 2024 06:40) (75 - 90)  BP: 115/62 (06 Jun 2024 06:40) (112/74 - 138/81)  BP(mean): --  RR: 18 (06 Jun 2024 06:40) (17 - 18)  SpO2: 100% (06 Jun 2024 06:40) (98% - 100%)    Parameters below as of 06 Jun 2024 06:40  Patient On (Oxygen Delivery Method): room air        CONSTITUTIONAL: NAD; well-developed  HEENT: PERRL, clear conjunctiva  RESPIRATORY: Normal respiratory effort; lungs are clear to auscultation bilaterally; No Crackles/Rhonchi/Wheezing  CARDIOVASCULAR: Regular rate and rhythm, normal S1 and S2, no murmur/rub/gallop; No lower extremity edema; Peripheral pulses are 2+ bilaterally  ABDOMEN: non distended, soft, tenderness to palpation in UQ, more so in LUQ, robertson sign positive, normoactive bowel sounds, no rebound/guarding; No hepatosplenomegaly  MUSCULOSKELETAL: no clubbing or cyanosis of digits; no joint swelling or tenderness to palpation  EXTREMITY: Lower extremities Non-tender to palpation; non-erythematous B/L  NEURO: A&Ox3; no focal deficits   PSYCH: normal mood; Affect appropirate    LABS:                        11.0   8.82  )-----------( 354      ( 06 Jun 2024 05:56 )             36.1     06-05    139  |  101  |  11  ----------------------------<  125<H>  4.0   |  23  |  0.57    Ca    9.4      05 Jun 2024 05:20  Phos  4.2     06-05  Mg     2.40     06-05    TPro  7.6  /  Alb  4.0  /  TBili  0.4  /  DBili  x   /  AST  13  /  ALT  12  /  AlkPhos  86  06-05          Urinalysis Basic - ( 05 Jun 2024 05:20 )    Color: x / Appearance: x / SG: x / pH: x  Gluc: 125 mg/dL / Ketone: x  / Bili: x / Urobili: x   Blood: x / Protein: x / Nitrite: x   Leuk Esterase: x / RBC: x / WBC x   Sq Epi: x / Non Sq Epi: x / Bacteria: x        Culture - Blood (collected 04 Jun 2024 15:00)  Source: .Blood Blood-Peripheral  Preliminary Report (05 Jun 2024 19:02):    No growth at 24 hours    Culture - Blood (collected 04 Jun 2024 14:45)  Source: .Blood Blood-Peripheral  Preliminary Report (05 Jun 2024 19:02):    No growth at 24 hours        RADIOLOGY & ADDITIONAL TESTS:  Results Reviewed:   Imaging Personally Reviewed:  Electrocardiogram Personally Reviewed:    COORDINATION OF CARE:  Care Discussed with Consultants/Other Providers [Y/N]:  Prior or Outpatient Records Reviewed [Y/N]:   ***********************************************  Tigre Lagunas MD  Internal Medicine   PGY1  ***********************************************      PROGRESS NOTE:     Patient is a 58y old  Female who presents with a chief complaint of Abdominal pain (05 Jun 2024 09:44)      SUBJECTIVE / OVERNIGHT EVENTS:    OVERNIGHT: No overnight events       Pt seen in AM at bedside, resting in bed. Abdominal pain is unchanged, still feels uncomfortable and intermittently making her SOB. NO BM. When asked, pt denies any recent or active fever, chills, nausea, vomiting, headache, acute sob, chest pain, genitourinary sx, extremity pain or swelling.          MEDICATIONS  (STANDING):  amLODIPine   Tablet 5 milliGRAM(s) Oral daily  amoxicillin 1000 milliGRAM(s) Oral two times a day  clarithromycin 500 milliGRAM(s) Oral two times a day  dextrose 50% Injectable 12.5 Gram(s) IV Push once  dextrose 50% Injectable 25 Gram(s) IV Push once  dextrose 50% Injectable 25 Gram(s) IV Push once  dextrose Oral Gel 15 Gram(s) Oral once  enoxaparin Injectable 40 milliGRAM(s) SubCutaneous every 24 hours  glucagon  Injectable 1 milliGRAM(s) IntraMuscular once  insulin lispro (ADMELOG) corrective regimen sliding scale   SubCutaneous every 6 hours  lactated ringers. 1000 milliLiter(s) (100 mL/Hr) IV Continuous <Continuous>  losartan 25 milliGRAM(s) Oral daily  melatonin 3 milliGRAM(s) Oral at bedtime  pantoprazole    Tablet 40 milliGRAM(s) Oral two times a day  polyethylene glycol 3350 17 Gram(s) Oral daily  senna 1 Tablet(s) Oral at bedtime    MEDICATIONS  (PRN):  aluminum hydroxide/magnesium hydroxide/simethicone Suspension 30 milliLiter(s) Oral every 4 hours PRN Dyspepsia  bisacodyl Suppository 10 milliGRAM(s) Rectal daily PRN Constipation  HYDROmorphone  Injectable 0.2 milliGRAM(s) IV Push every 6 hours PRN Severe Pain (7 - 10)      CAPILLARY BLOOD GLUCOSE      POCT Blood Glucose.: 113 mg/dL (06 Jun 2024 00:42)  POCT Blood Glucose.: 118 mg/dL (05 Jun 2024 21:47)  POCT Blood Glucose.: 159 mg/dL (05 Jun 2024 16:57)  POCT Blood Glucose.: 114 mg/dL (05 Jun 2024 12:45)    I&O's Summary    05 Jun 2024 07:01  -  06 Jun 2024 07:00  --------------------------------------------------------  IN: 940 mL / OUT: 0 mL / NET: 940 mL        PHYSICAL EXAM:  Vital Signs Last 24 Hrs  T(C): 36.9 (06 Jun 2024 06:40), Max: 36.9 (05 Jun 2024 13:00)  T(F): 98.4 (06 Jun 2024 06:40), Max: 98.4 (05 Jun 2024 13:00)  HR: 90 (06 Jun 2024 06:40) (75 - 90)  BP: 115/62 (06 Jun 2024 06:40) (112/74 - 138/81)  BP(mean): --  RR: 18 (06 Jun 2024 06:40) (17 - 18)  SpO2: 100% (06 Jun 2024 06:40) (98% - 100%)    Parameters below as of 06 Jun 2024 06:40  Patient On (Oxygen Delivery Method): room air        CONSTITUTIONAL: NAD; well-developed  HEENT: PERRL, clear conjunctiva  RESPIRATORY: Normal respiratory effort; lungs are clear to auscultation bilaterally; No Crackles/Rhonchi/Wheezing  CARDIOVASCULAR: Regular rate and rhythm, normal S1 and S2, no murmur/rub/gallop; No lower extremity edema; Peripheral pulses are 2+ bilaterally  ABDOMEN: non distended, soft, tenderness to palpation in UQ, more so in LUQ, robertson sign positive, normoactive bowel sounds, no rebound/guarding; No hepatosplenomegaly  MUSCULOSKELETAL: no clubbing or cyanosis of digits; no joint swelling or tenderness to palpation  EXTREMITY: Lower extremities Non-tender to palpation; non-erythematous B/L  NEURO: A&Ox3; no focal deficits   PSYCH: normal mood; Affect appropirate    LABS:                        11.0   8.82  )-----------( 354      ( 06 Jun 2024 05:56 )             36.1     06-05    139  |  101  |  11  ----------------------------<  125<H>  4.0   |  23  |  0.57    Ca    9.4      05 Jun 2024 05:20  Phos  4.2     06-05  Mg     2.40     06-05    TPro  7.6  /  Alb  4.0  /  TBili  0.4  /  DBili  x   /  AST  13  /  ALT  12  /  AlkPhos  86  06-05          Urinalysis Basic - ( 05 Jun 2024 05:20 )    Color: x / Appearance: x / SG: x / pH: x  Gluc: 125 mg/dL / Ketone: x  / Bili: x / Urobili: x   Blood: x / Protein: x / Nitrite: x   Leuk Esterase: x / RBC: x / WBC x   Sq Epi: x / Non Sq Epi: x / Bacteria: x        Culture - Blood (collected 04 Jun 2024 15:00)  Source: .Blood Blood-Peripheral  Preliminary Report (05 Jun 2024 19:02):    No growth at 24 hours    Culture - Blood (collected 04 Jun 2024 14:45)  Source: .Blood Blood-Peripheral  Preliminary Report (05 Jun 2024 19:02):    No growth at 24 hours        RADIOLOGY & ADDITIONAL TESTS:  Results Reviewed:   Imaging Personally Reviewed:  Electrocardiogram Personally Reviewed:    COORDINATION OF CARE:  Care Discussed with Consultants/Other Providers [Y/N]:  Prior or Outpatient Records Reviewed [Y/N]:

## 2024-06-06 NOTE — PROGRESS NOTE ADULT - PROBLEM SELECTOR PLAN 1
P/w consistent and severe abdominal pain, more so worse in upper quadrant region. Rodarte sign positive significantly on exam.   -CT A/P: A 4.3 cm complex right adnexal lesion which may represent a subserosal fibroid or ovarian lesion.   -US abdomen: Cholelithiasis and Hepatic steatosis.  -LFTs wnl  -HIDA scan negative for acute choley  -unclear etiology--> less likely adnexal mass is causing significant abdominal pain given location of pain   -concerns for biliary colic vs gastritis vs atypical ACS?  -no surgical intervention per surgery   -PPI w/ protonix 40mg qd  -infectious w/u sent, f/u Bcx and Ucx   -EKG and TTE ordered to atypical ACS---> EKG and TTE unremarkable  -on outpatient EGD found to have gastritis, may be source (?). Bx still pending, but will empirically treat with triple therapy for H. pylori infection. Will f/u biopsy results once made available to patient  -also with constipation--> aggressive bowel regimen

## 2024-06-06 NOTE — PROGRESS NOTE ADULT - PROBLEM SELECTOR PLAN 2
-CT A/P: A 4.3 cm complex right adnexal lesion which may represent a subserosal fibroid or ovarian lesion. Ultrasound correlation is recommended.  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 Ovarian origin and ovarian dermoid  are thought to be less likely. Additional small uterine fibroids.  -Gyn consult---> low suspicion that this is source of abdominal pain   -Cancer markers sent (CA 19-9, CEA, )

## 2024-06-07 LAB
ALBUMIN SERPL ELPH-MCNC: 3.8 G/DL — SIGNIFICANT CHANGE UP (ref 3.3–5)
ALP SERPL-CCNC: 80 U/L — SIGNIFICANT CHANGE UP (ref 40–120)
ALT FLD-CCNC: 10 U/L — SIGNIFICANT CHANGE UP (ref 4–33)
ANION GAP SERPL CALC-SCNC: 13 MMOL/L — SIGNIFICANT CHANGE UP (ref 7–14)
AST SERPL-CCNC: 12 U/L — SIGNIFICANT CHANGE UP (ref 4–32)
BILIRUB SERPL-MCNC: 0.4 MG/DL — SIGNIFICANT CHANGE UP (ref 0.2–1.2)
BUN SERPL-MCNC: 10 MG/DL — SIGNIFICANT CHANGE UP (ref 7–23)
CALCIUM SERPL-MCNC: 9 MG/DL — SIGNIFICANT CHANGE UP (ref 8.4–10.5)
CHLORIDE SERPL-SCNC: 104 MMOL/L — SIGNIFICANT CHANGE UP (ref 98–107)
CO2 SERPL-SCNC: 22 MMOL/L — SIGNIFICANT CHANGE UP (ref 22–31)
CREAT SERPL-MCNC: 0.56 MG/DL — SIGNIFICANT CHANGE UP (ref 0.5–1.3)
EGFR: 106 ML/MIN/1.73M2 — SIGNIFICANT CHANGE UP
GLUCOSE BLDC GLUCOMTR-MCNC: 110 MG/DL — HIGH (ref 70–99)
GLUCOSE BLDC GLUCOMTR-MCNC: 116 MG/DL — HIGH (ref 70–99)
GLUCOSE BLDC GLUCOMTR-MCNC: 121 MG/DL — HIGH (ref 70–99)
GLUCOSE BLDC GLUCOMTR-MCNC: 132 MG/DL — HIGH (ref 70–99)
GLUCOSE SERPL-MCNC: 102 MG/DL — HIGH (ref 70–99)
HCT VFR BLD CALC: 35.4 % — SIGNIFICANT CHANGE UP (ref 34.5–45)
HGB BLD-MCNC: 10.7 G/DL — LOW (ref 11.5–15.5)
MAGNESIUM SERPL-MCNC: 2.3 MG/DL — SIGNIFICANT CHANGE UP (ref 1.6–2.6)
MCHC RBC-ENTMCNC: 22.4 PG — LOW (ref 27–34)
MCHC RBC-ENTMCNC: 30.2 GM/DL — LOW (ref 32–36)
MCV RBC AUTO: 74.1 FL — LOW (ref 80–100)
NRBC # BLD: 0 /100 WBCS — SIGNIFICANT CHANGE UP (ref 0–0)
NRBC # FLD: 0 K/UL — SIGNIFICANT CHANGE UP (ref 0–0)
PHOSPHATE SERPL-MCNC: 4.1 MG/DL — SIGNIFICANT CHANGE UP (ref 2.5–4.5)
PLATELET # BLD AUTO: 362 K/UL — SIGNIFICANT CHANGE UP (ref 150–400)
POTASSIUM SERPL-MCNC: 3.7 MMOL/L — SIGNIFICANT CHANGE UP (ref 3.5–5.3)
POTASSIUM SERPL-SCNC: 3.7 MMOL/L — SIGNIFICANT CHANGE UP (ref 3.5–5.3)
PROT SERPL-MCNC: 7.2 G/DL — SIGNIFICANT CHANGE UP (ref 6–8.3)
RBC # BLD: 4.78 M/UL — SIGNIFICANT CHANGE UP (ref 3.8–5.2)
RBC # FLD: 16 % — HIGH (ref 10.3–14.5)
SODIUM SERPL-SCNC: 139 MMOL/L — SIGNIFICANT CHANGE UP (ref 135–145)
WBC # BLD: 7.08 K/UL — SIGNIFICANT CHANGE UP (ref 3.8–10.5)
WBC # FLD AUTO: 7.08 K/UL — SIGNIFICANT CHANGE UP (ref 3.8–10.5)

## 2024-06-07 PROCEDURE — 99233 SBSQ HOSP IP/OBS HIGH 50: CPT | Mod: GC

## 2024-06-07 RX ORDER — LACTOBACILLUS ACIDOPHILUS 100MM CELL
1 CAPSULE ORAL
Refills: 0 | Status: DISCONTINUED | OUTPATIENT
Start: 2024-06-07 | End: 2024-06-08

## 2024-06-07 RX ORDER — ALPRAZOLAM 0.25 MG
0.25 TABLET ORAL ONCE
Refills: 0 | Status: DISCONTINUED | OUTPATIENT
Start: 2024-06-07 | End: 2024-06-07

## 2024-06-07 RX ADMIN — HYDROMORPHONE HYDROCHLORIDE 0.2 MILLIGRAM(S): 2 INJECTION INTRAMUSCULAR; INTRAVENOUS; SUBCUTANEOUS at 07:25

## 2024-06-07 RX ADMIN — HYDROMORPHONE HYDROCHLORIDE 0.2 MILLIGRAM(S): 2 INJECTION INTRAMUSCULAR; INTRAVENOUS; SUBCUTANEOUS at 17:48

## 2024-06-07 RX ADMIN — HYDROMORPHONE HYDROCHLORIDE 0.2 MILLIGRAM(S): 2 INJECTION INTRAMUSCULAR; INTRAVENOUS; SUBCUTANEOUS at 06:25

## 2024-06-07 RX ADMIN — NYSTATIN CREAM 1 APPLICATION(S): 100000 CREAM TOPICAL at 06:30

## 2024-06-07 RX ADMIN — Medication 0.25 MILLIGRAM(S): at 00:41

## 2024-06-07 RX ADMIN — HYDROMORPHONE HYDROCHLORIDE 0.2 MILLIGRAM(S): 2 INJECTION INTRAMUSCULAR; INTRAVENOUS; SUBCUTANEOUS at 16:48

## 2024-06-07 RX ADMIN — PANTOPRAZOLE SODIUM 40 MILLIGRAM(S): 20 TABLET, DELAYED RELEASE ORAL at 06:25

## 2024-06-07 RX ADMIN — Medication 500 MILLIGRAM(S): at 06:25

## 2024-06-07 RX ADMIN — ENOXAPARIN SODIUM 40 MILLIGRAM(S): 100 INJECTION SUBCUTANEOUS at 17:48

## 2024-06-07 RX ADMIN — PANTOPRAZOLE SODIUM 40 MILLIGRAM(S): 20 TABLET, DELAYED RELEASE ORAL at 17:49

## 2024-06-07 RX ADMIN — Medication 10 MILLIGRAM(S): at 08:02

## 2024-06-07 RX ADMIN — Medication 3 MILLIGRAM(S): at 22:24

## 2024-06-07 RX ADMIN — Medication 30 MILLILITER(S): at 06:25

## 2024-06-07 RX ADMIN — Medication 1000 MILLIGRAM(S): at 06:29

## 2024-06-07 RX ADMIN — NYSTATIN CREAM 1 APPLICATION(S): 100000 CREAM TOPICAL at 18:21

## 2024-06-07 RX ADMIN — Medication 30 MILLILITER(S): at 15:50

## 2024-06-07 RX ADMIN — Medication 1000 MILLIGRAM(S): at 17:49

## 2024-06-07 RX ADMIN — Medication 500 MILLIGRAM(S): at 17:49

## 2024-06-07 RX ADMIN — Medication 1 TABLET(S): at 18:20

## 2024-06-07 RX ADMIN — POLYETHYLENE GLYCOL 3350 17 GRAM(S): 17 POWDER, FOR SOLUTION ORAL at 14:05

## 2024-06-07 NOTE — PROGRESS NOTE ADULT - PROBLEM SELECTOR PLAN 2
-CT A/P: A 4.3 cm complex right adnexal lesion which may represent a subserosal fibroid or ovarian lesion. Ultrasound correlation is recommended.  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 Ovarian origin and ovarian dermoid  are thought to be less likely. Additional small uterine fibroids.  -Gyn consult---> low suspicion that this is source of abdominal pain   -Cancer markers sent (CA 19-9, CEA, ) -CT A/P: A 4.3 cm complex right adnexal lesion which may represent a subserosal fibroid or ovarian lesion. Ultrasound correlation is recommended.  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 Ovarian origin and ovarian dermoid  are thought to be less likely. Additional small uterine fibroids.  -Gyn consult---> low suspicion that this is source of abdominal pain   -Cancer markers sent (CA 19-9, CEA, )--> all negative

## 2024-06-07 NOTE — PROGRESS NOTE ADULT - SUBJECTIVE AND OBJECTIVE BOX
***********************************************  Tigre Lagunas MD  Internal Medicine   PGY1  ***********************************************      PROGRESS NOTE:     Patient is a 58y old  Female who presents with a chief complaint of Abdominal pain (06 Jun 2024 07:46)      SUBJECTIVE / OVERNIGHT EVENTS:    OVERNIGHT:       Pt seen in AM at bedside, resting comfortably in bed, and does not appear to be in any acute distress. When asked, pt denies any recent or active fever, chills, nausea, vomiting, headache, acute sob, chest pain, abdominal pain, genitourinary sx, extremity pain or swelling.          MEDICATIONS  (STANDING):  amoxicillin 1000 milliGRAM(s) Oral two times a day  clarithromycin 500 milliGRAM(s) Oral two times a day  dextrose 50% Injectable 12.5 Gram(s) IV Push once  dextrose 50% Injectable 25 Gram(s) IV Push once  dextrose 50% Injectable 25 Gram(s) IV Push once  dextrose Oral Gel 15 Gram(s) Oral once  enoxaparin Injectable 40 milliGRAM(s) SubCutaneous every 24 hours  glucagon  Injectable 1 milliGRAM(s) IntraMuscular once  insulin lispro (ADMELOG) corrective regimen sliding scale   SubCutaneous every 6 hours  melatonin 3 milliGRAM(s) Oral at bedtime  nystatin Cream 1 Application(s) Topical two times a day  pantoprazole    Tablet 40 milliGRAM(s) Oral two times a day  polyethylene glycol 3350 17 Gram(s) Oral daily  senna 1 Tablet(s) Oral at bedtime    MEDICATIONS  (PRN):  aluminum hydroxide/magnesium hydroxide/simethicone Suspension 30 milliLiter(s) Oral every 4 hours PRN Dyspepsia  bisacodyl Suppository 10 milliGRAM(s) Rectal daily PRN Constipation  HYDROmorphone  Injectable 0.2 milliGRAM(s) IV Push every 6 hours PRN Severe Pain (7 - 10)      CAPILLARY BLOOD GLUCOSE      POCT Blood Glucose.: 110 mg/dL (07 Jun 2024 06:06)  POCT Blood Glucose.: 116 mg/dL (06 Jun 2024 23:51)  POCT Blood Glucose.: 117 mg/dL (06 Jun 2024 17:37)  POCT Blood Glucose.: 113 mg/dL (06 Jun 2024 11:40)  POCT Blood Glucose.: 164 mg/dL (06 Jun 2024 08:26)    I&O's Summary      PHYSICAL EXAM:  Vital Signs Last 24 Hrs  T(C): 36.3 (07 Jun 2024 06:28), Max: 37.2 (06 Jun 2024 16:30)  T(F): 97.3 (07 Jun 2024 06:28), Max: 99 (06 Jun 2024 16:30)  HR: 70 (07 Jun 2024 06:28) (65 - 74)  BP: 130/85 (07 Jun 2024 06:28) (89/68 - 141/79)  BP(mean): --  RR: 15 (07 Jun 2024 06:28) (15 - 18)  SpO2: 100% (07 Jun 2024 06:28) (96% - 100%)    Parameters below as of 07 Jun 2024 06:28  Patient On (Oxygen Delivery Method): room air        CONSTITUTIONAL: NAD; well-developed  HEENT: PERRL, clear conjunctiva  RESPIRATORY: Normal respiratory effort; lungs are clear to auscultation bilaterally; No Crackles/Rhonchi/Wheezing  CARDIOVASCULAR: Regular rate and rhythm, normal S1 and S2, no murmur/rub/gallop; No lower extremity edema; Peripheral pulses are 2+ bilaterally  ABDOMEN: Nontender to palpation, normoactive bowel sounds, no rebound/guarding; No hepatosplenomegaly  MUSCULOSKELETAL: no clubbing or cyanosis of digits; no joint swelling or tenderness to palpation  EXTREMITY: Lower extremities Non-tender to palpation; non-erythematous B/L  NEURO: A&Ox3; no focal deficits   PSYCH: normal mood; Affect appropirate    LABS:                        11.0   8.82  )-----------( 354      ( 06 Jun 2024 05:56 )             36.1     06-06    137  |  102  |  13  ----------------------------<  105<H>  3.9   |  23  |  0.56    Ca    9.2      06 Jun 2024 05:56  Phos  3.8     06-06  Mg     2.40     06-06            Urinalysis Basic - ( 06 Jun 2024 05:56 )    Color: x / Appearance: x / SG: x / pH: x  Gluc: 105 mg/dL / Ketone: x  / Bili: x / Urobili: x   Blood: x / Protein: x / Nitrite: x   Leuk Esterase: x / RBC: x / WBC x   Sq Epi: x / Non Sq Epi: x / Bacteria: x        Culture - Blood (collected 04 Jun 2024 15:00)  Source: .Blood Blood-Peripheral  Preliminary Report (06 Jun 2024 19:02):    No growth at 48 Hours    Culture - Blood (collected 04 Jun 2024 14:45)  Source: .Blood Blood-Peripheral  Preliminary Report (06 Jun 2024 19:02):    No growth at 48 Hours    Culture - Urine (collected 04 Jun 2024 12:28)  Source: Clean Catch Clean Catch (Midstream)  Final Report (06 Jun 2024 10:28):    <10,000 CFU/mL Normal Urogenital Bea        RADIOLOGY & ADDITIONAL TESTS:  Results Reviewed:   Imaging Personally Reviewed:  Electrocardiogram Personally Reviewed:    COORDINATION OF CARE:  Care Discussed with Consultants/Other Providers [Y/N]:  Prior or Outpatient Records Reviewed [Y/N]:   ***********************************************  Tigre Lagunas MD  Internal Medicine   PGY1  ***********************************************      PROGRESS NOTE:     Patient is a 58y old  Female who presents with a chief complaint of Abdominal pain (06 Jun 2024 07:46)      SUBJECTIVE / OVERNIGHT EVENTS:    OVERNIGHT: No overnight events       Pt seen in AM at bedside, resting comfortably in bed, and does not appear to be in any acute distress. Abdominal pain improved, able to tolerate PO somewhat better. Feels weak today, had difficulty walking. When asked, pt denies any recent or active fever, chills, nausea, vomiting, headache, acute sob, chest pain, genitourinary sx, extremity pain or swelling.          MEDICATIONS  (STANDING):  amoxicillin 1000 milliGRAM(s) Oral two times a day  clarithromycin 500 milliGRAM(s) Oral two times a day  dextrose 50% Injectable 12.5 Gram(s) IV Push once  dextrose 50% Injectable 25 Gram(s) IV Push once  dextrose 50% Injectable 25 Gram(s) IV Push once  dextrose Oral Gel 15 Gram(s) Oral once  enoxaparin Injectable 40 milliGRAM(s) SubCutaneous every 24 hours  glucagon  Injectable 1 milliGRAM(s) IntraMuscular once  insulin lispro (ADMELOG) corrective regimen sliding scale   SubCutaneous every 6 hours  melatonin 3 milliGRAM(s) Oral at bedtime  nystatin Cream 1 Application(s) Topical two times a day  pantoprazole    Tablet 40 milliGRAM(s) Oral two times a day  polyethylene glycol 3350 17 Gram(s) Oral daily  senna 1 Tablet(s) Oral at bedtime    MEDICATIONS  (PRN):  aluminum hydroxide/magnesium hydroxide/simethicone Suspension 30 milliLiter(s) Oral every 4 hours PRN Dyspepsia  bisacodyl Suppository 10 milliGRAM(s) Rectal daily PRN Constipation  HYDROmorphone  Injectable 0.2 milliGRAM(s) IV Push every 6 hours PRN Severe Pain (7 - 10)      CAPILLARY BLOOD GLUCOSE      POCT Blood Glucose.: 110 mg/dL (07 Jun 2024 06:06)  POCT Blood Glucose.: 116 mg/dL (06 Jun 2024 23:51)  POCT Blood Glucose.: 117 mg/dL (06 Jun 2024 17:37)  POCT Blood Glucose.: 113 mg/dL (06 Jun 2024 11:40)  POCT Blood Glucose.: 164 mg/dL (06 Jun 2024 08:26)    I&O's Summary      PHYSICAL EXAM:  Vital Signs Last 24 Hrs  T(C): 36.3 (07 Jun 2024 06:28), Max: 37.2 (06 Jun 2024 16:30)  T(F): 97.3 (07 Jun 2024 06:28), Max: 99 (06 Jun 2024 16:30)  HR: 70 (07 Jun 2024 06:28) (65 - 74)  BP: 130/85 (07 Jun 2024 06:28) (89/68 - 141/79)  BP(mean): --  RR: 15 (07 Jun 2024 06:28) (15 - 18)  SpO2: 100% (07 Jun 2024 06:28) (96% - 100%)    Parameters below as of 07 Jun 2024 06:28  Patient On (Oxygen Delivery Method): room air        CONSTITUTIONAL: NAD; well-developed  HEENT: PERRL, clear conjunctiva  RESPIRATORY: Normal respiratory effort; lungs are clear to auscultation bilaterally; No Crackles/Rhonchi/Wheezing  CARDIOVASCULAR: Regular rate and rhythm, normal S1 and S2, no murmur/rub/gallop; No lower extremity edema; Peripheral pulses are 2+ bilaterally  ABDOMEN: tenderness to palpation in b/l lower quadrant , normoactive bowel sounds, no rebound/guarding; No hepatosplenomegaly  MUSCULOSKELETAL: no clubbing or cyanosis of digits; no joint swelling or tenderness to palpation  EXTREMITY: Lower extremities Non-tender to palpation; non-erythematous B/L  NEURO: A&Ox3; no focal deficits   PSYCH: normal mood; Affect appropirate    LABS:                        11.0   8.82  )-----------( 354      ( 06 Jun 2024 05:56 )             36.1     06-06    137  |  102  |  13  ----------------------------<  105<H>  3.9   |  23  |  0.56    Ca    9.2      06 Jun 2024 05:56  Phos  3.8     06-06  Mg     2.40     06-06            Urinalysis Basic - ( 06 Jun 2024 05:56 )    Color: x / Appearance: x / SG: x / pH: x  Gluc: 105 mg/dL / Ketone: x  / Bili: x / Urobili: x   Blood: x / Protein: x / Nitrite: x   Leuk Esterase: x / RBC: x / WBC x   Sq Epi: x / Non Sq Epi: x / Bacteria: x        Culture - Blood (collected 04 Jun 2024 15:00)  Source: .Blood Blood-Peripheral  Preliminary Report (06 Jun 2024 19:02):    No growth at 48 Hours    Culture - Blood (collected 04 Jun 2024 14:45)  Source: .Blood Blood-Peripheral  Preliminary Report (06 Jun 2024 19:02):    No growth at 48 Hours    Culture - Urine (collected 04 Jun 2024 12:28)  Source: Clean Catch Clean Catch (Midstream)  Final Report (06 Jun 2024 10:28):    <10,000 CFU/mL Normal Urogenital Bea        RADIOLOGY & ADDITIONAL TESTS:  Results Reviewed:   Imaging Personally Reviewed:  Electrocardiogram Personally Reviewed:    COORDINATION OF CARE:  Care Discussed with Consultants/Other Providers [Y/N]:  Prior or Outpatient Records Reviewed [Y/N]:

## 2024-06-07 NOTE — PROGRESS NOTE ADULT - PROBLEM SELECTOR PLAN 4
Home regimen: Amlodipine 5 mg qd and Losartan 25mg qd  -resume home amlodipine and losartan with hold parameters Home regimen: Amlodipine 5 mg qd and Losartan 25mg qd  -held 6/06 due to low BP

## 2024-06-07 NOTE — PROGRESS NOTE ADULT - ASSESSMENT
58-year-old woman with history of diabetes, hypertension, hyperlipidemia, cholelithiasis, complex cystic right adnexal mass presents to the ED with abdominal pain and shortness of breath found to have known adnexal mass and concerns for biliary colic vs cholecystitis admitted for further work-up and management. HIDA negative for acute choley, no surgical intervention. MRI showed Right adnexal lipid-containing mass, unclear if cause of abdominal pain. Gyn consulted, pending further recs. R/o atypical ACS, EKG and TTE pending.  58-year-old woman with history of diabetes, hypertension, hyperlipidemia, cholelithiasis, complex cystic right adnexal mass presents to the ED with abdominal pain and shortness of breath found to have known adnexal mass and concerns for biliary colic vs cholecystitis admitted for further work-up and management. HIDA negative for acute choley, no surgical intervention. MRI showed Right adnexal lipid-containing mass, unclear if cause of abdominal pain. Gyn consulted, no inpatient intervention and unlikely cause of abdominal pain. EKG and TTE negative for atypical ACS. Started on empiric triple therapy of H. pylori with improvement in abdominal pain.

## 2024-06-07 NOTE — PROGRESS NOTE ADULT - PROBLEM SELECTOR PLAN 1
P/w consistent and severe abdominal pain, more so worse in upper quadrant region. Rodarte sign positive significantly on exam.   -CT A/P: A 4.3 cm complex right adnexal lesion which may represent a subserosal fibroid or ovarian lesion.   -US abdomen: Cholelithiasis and Hepatic steatosis.  -LFTs wnl  -HIDA scan negative for acute choley  -unclear etiology--> less likely adnexal mass is causing significant abdominal pain given location of pain   -concerns for biliary colic vs gastritis vs atypical ACS?  -no surgical intervention per surgery   -PPI w/ protonix 40mg qd  -infectious w/u sent, f/u Bcx and Ucx   -EKG and TTE ordered to atypical ACS---> EKG and TTE unremarkable  -on outpatient EGD found to have gastritis, may be source (?). Bx still pending, but will empirically treat with triple therapy for H. pylori infection. Will f/u biopsy results once made available to patient  -also with constipation--> aggressive bowel regimen P/w consistent and severe abdominal pain, more so worse in upper quadrant region. Rodarte sign positive significantly on exam.   -CT A/P: A 4.3 cm complex right adnexal lesion which may represent a subserosal fibroid or ovarian lesion.   -US abdomen: Cholelithiasis and Hepatic steatosis.  -LFTs wnl  -HIDA scan negative for acute choley  -unclear etiology--> less likely adnexal mass is causing significant abdominal pain given location of pain   -concerns for biliary colic vs gastritis vs atypical ACS?  -no surgical intervention per surgery   -PPI w/ protonix 40mg qd  -infectious w/u sent, f/u Bcx and Ucx   -EKG and TTE ordered to atypical ACS---> EKG and TTE unremarkable  -on outpatient EGD found to have gastritis, may be source (?). Bx still pending, but will empirically treat with triple therapy for H. pylori infection. Will f/u biopsy results once made available to patient  -abdominal pain improved with triple therapy--> will dc on treatment   -also with constipation--> aggressive bowel regimen

## 2024-06-08 ENCOUNTER — TRANSCRIPTION ENCOUNTER (OUTPATIENT)
Age: 59
End: 2024-06-08

## 2024-06-08 VITALS
TEMPERATURE: 99 F | DIASTOLIC BLOOD PRESSURE: 78 MMHG | SYSTOLIC BLOOD PRESSURE: 124 MMHG | HEART RATE: 85 BPM | OXYGEN SATURATION: 100 % | RESPIRATION RATE: 18 BRPM

## 2024-06-08 LAB
GLUCOSE BLDC GLUCOMTR-MCNC: 110 MG/DL — HIGH (ref 70–99)
GLUCOSE BLDC GLUCOMTR-MCNC: 114 MG/DL — HIGH (ref 70–99)
GLUCOSE BLDC GLUCOMTR-MCNC: 124 MG/DL — HIGH (ref 70–99)

## 2024-06-08 PROCEDURE — 99239 HOSP IP/OBS DSCHRG MGMT >30: CPT | Mod: GC

## 2024-06-08 RX ORDER — AMLODIPINE BESYLATE 2.5 MG/1
1 TABLET ORAL
Refills: 0 | DISCHARGE

## 2024-06-08 RX ORDER — ALBUTEROL 90 UG/1
2 AEROSOL, METERED ORAL
Qty: 2 | Refills: 0
Start: 2024-06-08 | End: 2024-07-07

## 2024-06-08 RX ORDER — AMOXICILLIN 250 MG/5ML
2 SUSPENSION, RECONSTITUTED, ORAL (ML) ORAL
Qty: 36 | Refills: 0
Start: 2024-06-08 | End: 2024-06-16

## 2024-06-08 RX ORDER — LACTOBACILLUS ACIDOPHILUS 100MM CELL
1 CAPSULE ORAL
Qty: 14 | Refills: 0
Start: 2024-06-08 | End: 2024-06-21

## 2024-06-08 RX ORDER — PANTOPRAZOLE SODIUM 20 MG/1
1 TABLET, DELAYED RELEASE ORAL
Qty: 18 | Refills: 0
Start: 2024-06-08 | End: 2024-06-16

## 2024-06-08 RX ORDER — FAMOTIDINE 10 MG/ML
1 INJECTION INTRAVENOUS
Refills: 0 | DISCHARGE

## 2024-06-08 RX ORDER — CLARITHROMYCIN 500 MG
1 TABLET ORAL
Qty: 18 | Refills: 0
Start: 2024-06-08 | End: 2024-06-16

## 2024-06-08 RX ADMIN — Medication 30 MILLILITER(S): at 03:58

## 2024-06-08 RX ADMIN — PANTOPRAZOLE SODIUM 40 MILLIGRAM(S): 20 TABLET, DELAYED RELEASE ORAL at 06:46

## 2024-06-08 RX ADMIN — Medication 1 TABLET(S): at 09:02

## 2024-06-08 RX ADMIN — POLYETHYLENE GLYCOL 3350 17 GRAM(S): 17 POWDER, FOR SOLUTION ORAL at 11:37

## 2024-06-08 RX ADMIN — Medication 500 MILLIGRAM(S): at 06:45

## 2024-06-08 RX ADMIN — Medication 1000 MILLIGRAM(S): at 06:48

## 2024-06-08 RX ADMIN — NYSTATIN CREAM 1 APPLICATION(S): 100000 CREAM TOPICAL at 06:48

## 2024-06-08 NOTE — DISCHARGE NOTE PROVIDER - PROVIDER TOKENS
FREE:[LAST:[Janneth],FIRST:[Tigre],PHONE:[phone]],FAX:[(   )    -],ADDRESS:[[facility]  Located in: [facility]  Address: [address] [place] [address]],FOLLOWUP:[2 weeks]]

## 2024-06-08 NOTE — PROVIDER CONTACT NOTE (OTHER) - BACKGROUND
Patient requests to lessen frequency of blood glucose test so she can sleep without interruption
anxiety leading to SOB

## 2024-06-08 NOTE — PROGRESS NOTE ADULT - PROBLEM SELECTOR PLAN 3
Home regimen: Metformin 750mg BID  -ISS  -Consistent Carb diet (no pork or beef)
Home regimen: Metformin 750mg BID  -ISS  -Consistent Carb diet (no pork or beef)   -A1C in AM

## 2024-06-08 NOTE — DISCHARGE NOTE PROVIDER - CARE PROVIDER_API CALL
Tigre Lagunas  [facility]  Located in: [facility]  Address: [address] [facility], [place] [address]  Phone: [phone]  Fax: (   )    -  Follow Up Time: 2 weeks

## 2024-06-08 NOTE — PROGRESS NOTE ADULT - ATTENDING COMMENTS
Abdominal discomfort now improving. 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 We have started empiric H. pylori treatment and will add probiotic to avoid consequences of abx therapy. We have also initiated an aggressive bowel regimen including enemas and pt is now having some relief of large stool burden. Explain to pt that definitive management of right adnexal mass is typically outpatient, and will first require Gyn f/u for Pap and endometrial biopsy. Will encourage oral diet.     Plan for dc today
Pt reports fairly stable abd pain. HIDA showed no evidence of cholecystitis. 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 Outside records demonstrate that pt had gastritis on recent EGD in 5/2024, so will continue PPI. H. pylori testing will not be reported for another 1-2 weeks per outside office; favor treating empirically at this time given persistent symptoms. Broadened work-up including TTE unrevealing thus far. F/u blood and urine cultures, which are NGTD.
Pt with negative HIDA scan. MR pelvis shows a 4x4x4 right adnexal mass that may be a pedunculated uterine lipoleiomyoma. It is difficult to explain the pt's full picture of severe abdominal pain based solely on this adnexal mass. Will continue analgesia and start bowel regimen given moderate stool burden on imaging. Will also expand work-up in light of borderline leukocytosis, including UA/UCx, BCx, and also EKG/TTE, as pt has several signs/risk factors for CV disease and would like to rule out atypical cardiac symptoms.
Pt reports some improvement in epigastric/RUQ pain today. Still not eating much but willing to try some soup. Likely related to low oral intake, pt has had some low BP, now improving on IVF. Will continue fluids at this time and reassess. Pt does still endorse lower abd pain which is likely related to adnexal mass (possible lipoleiomyoma per MRI report). Cardiac evaluation is reassuring. Finally, pt notes inguinal itching and on exam has what is likely tinea cruris; will order topical nystatin and reassess.
Pt maintains that she still has significant abdominal discomfort. 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 We have started empiric H. pylori treatment and will add probiotic to avoid consequences of abx therapy. We have also initiated an aggressive bowel regimen including enemas and pt is now having some relief of large stool burden. Explain to pt that definitive management of right adnexal mass is typically outpatient, and will first require Gyn f/u for Pap and endometrial biopsy. Will encourage oral diet. Plan to update pt's son with plan of care.

## 2024-06-08 NOTE — DISCHARGE NOTE PROVIDER - NSDCCPTREATMENT_GEN_ALL_CORE_FT
PRINCIPAL PROCEDURE  Procedure: MRI pelvis  Findings and Treatment: FINDINGS:  UTERUS: Measures 8.5 x 4.3 x 5.4 cm. Left anterior subserosal fibroid   measuring 1.4 x 1.2 x 1.1 cm. Left anterior intramural fibroid measuring   0.9 x 0.8 x 0.7 cm. Nabothian cysts. Findings pertaining to the right   adnexal mass is described below.  ENDOMETRIUM: Within normal limits. Measures 3 mm in thickness.  JUNCTIONAL ZONE: Within normal limits.  RIGHT OVARY/ADNEXA: Mass within the right adnexa measuring 4.5 x 4.1 x   4.0 cm that demonstrates heterogeneous T2 hypointensity and T1   intermediate to slightly hyperintense signal relative to myometrium.   There is intralesional signal loss on out of phase imaging, indicative of   the presence of lipid content. This mass is favored to be arising from   the right aspect of the uterus via a stalk (series 6 image 13).  The likely candidate for the right ovary is seen immediately posterior to   and favored to be separate from this mass, measuring 2.4 x 1.5 x 2.9 cm   (series 6 image 8, series 5 image 15). There is an associated simple   appearing ovarian or paraovarian cyst that measures 1.2 x 1.0 x 1.4 cm.  LEFT OVARY/ADNEXA: Left ovary is within normal limits, measuring 2.2 x   1.3 x 3.0 cm.  BLADDER: Within normal limits.  LYMPH NODES: No pelvic lymphadenopathy.  VISUALIZED PORTIONS:  ABDOMINAL ORGANS: Cholelithiasis.  BOWEL: Within normal limits.  PERITONEUM: No ascites.  VESSELS: Within normal limits.  ABDOMINAL WALL: Within normal limits.  BONES: Degenerative changes.  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 Ovarian origin and ovarian dermoid   are thought to be less likely.  *  Additional small uterine fibroids.

## 2024-06-08 NOTE — PROGRESS NOTE ADULT - PROBLEM SELECTOR PLAN 5
Dvt ppx: Lovenox 40mg qd   Diet:  CC diet (no pork or beef)  Dispo: PT consult--> no PT needs
Dvt ppx: Will hold for possible procedure, Lovenox 40mg qd once cleared  Diet: NPO until cleared by discharge, CC diet (no pork or beed) once cleared  Dispo: Pending PT consult
Dvt ppx: Lovenox 40mg qd   Diet:  CC diet (no pork or beef)  Dispo: PT consult--> no PT needs

## 2024-06-08 NOTE — PROGRESS NOTE ADULT - PROBLEM SELECTOR PLAN 2
-CT A/P: A 4.3 cm complex right adnexal lesion which may represent a subserosal fibroid or ovarian lesion. Ultrasound correlation is recommended.  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 Ovarian origin and ovarian dermoid  are thought to be less likely. Additional small uterine fibroids.  -Gyn consult---> low suspicion that this is source of abdominal pain   -Cancer markers sent (CA 19-9, CEA, )--> all negative

## 2024-06-08 NOTE — PROGRESS NOTE ADULT - PROBLEM SELECTOR PLAN 1
P/w consistent and severe abdominal pain, more so worse in upper quadrant region. Rodarte sign positive significantly on exam.   -CT A/P: A 4.3 cm complex right adnexal lesion which may represent a subserosal fibroid or ovarian lesion.   -US abdomen: Cholelithiasis and Hepatic steatosis.  -LFTs wnl  -HIDA scan negative for acute choley  -unclear etiology--> less likely adnexal mass is causing significant abdominal pain given location of pain   -concerns for biliary colic vs gastritis vs atypical ACS?  -no surgical intervention per surgery   -PPI w/ protonix 40mg qd  -infectious w/u sent, f/u Bcx and Ucx   -EKG and TTE ordered to atypical ACS---> EKG and TTE unremarkable  -on outpatient EGD found to have gastritis, may be source (?). Bx still pending, but will empirically treat with triple therapy for H. pylori infection. Will f/u biopsy results once made available to patient  -abdominal pain improved with triple therapy--> will dc on treatment   -also with constipation--> aggressive bowel regimen

## 2024-06-08 NOTE — DISCHARGE NOTE PROVIDER - NSDCFUSCHEDAPPT_GEN_ALL_CORE_FT
[facility]  [email or web address]  [id number]  Scheduled Appointment: 06/12/2024

## 2024-06-08 NOTE — PROGRESS NOTE ADULT - SUBJECTIVE AND OBJECTIVE BOX
***********************************************  Tigre Lagunas MD  Internal Medicine   PGY1  ***********************************************      PROGRESS NOTE:     Patient is a 58y old  Female who presents with a chief complaint of Abdominal pain (07 Jun 2024 07:30)      SUBJECTIVE / OVERNIGHT EVENTS:    OVERNIGHT:       Pt seen in AM at bedside, resting comfortably in bed, and does not appear to be in any acute distress. When asked, pt denies any recent or active fever, chills, nausea, vomiting, headache, acute sob, chest pain, abdominal pain, genitourinary sx, extremity pain or swelling.          MEDICATIONS  (STANDING):  amoxicillin 1000 milliGRAM(s) Oral two times a day  clarithromycin 500 milliGRAM(s) Oral two times a day  dextrose 50% Injectable 25 Gram(s) IV Push once  dextrose 50% Injectable 12.5 Gram(s) IV Push once  dextrose 50% Injectable 25 Gram(s) IV Push once  dextrose Oral Gel 15 Gram(s) Oral once  enoxaparin Injectable 40 milliGRAM(s) SubCutaneous every 24 hours  glucagon  Injectable 1 milliGRAM(s) IntraMuscular once  insulin lispro (ADMELOG) corrective regimen sliding scale   SubCutaneous every 6 hours  lactobacillus acidophilus 1 Tablet(s) Oral two times a day with meals  melatonin 3 milliGRAM(s) Oral at bedtime  nystatin Cream 1 Application(s) Topical two times a day  pantoprazole    Tablet 40 milliGRAM(s) Oral two times a day  polyethylene glycol 3350 17 Gram(s) Oral daily  senna 1 Tablet(s) Oral at bedtime    MEDICATIONS  (PRN):  aluminum hydroxide/magnesium hydroxide/simethicone Suspension 30 milliLiter(s) Oral every 4 hours PRN Dyspepsia  bisacodyl Suppository 10 milliGRAM(s) Rectal daily PRN Constipation  HYDROmorphone  Injectable 0.2 milliGRAM(s) IV Push every 6 hours PRN Severe Pain (7 - 10)      CAPILLARY BLOOD GLUCOSE      POCT Blood Glucose.: 114 mg/dL (08 Jun 2024 06:43)  POCT Blood Glucose.: 132 mg/dL (07 Jun 2024 17:37)  POCT Blood Glucose.: 116 mg/dL (07 Jun 2024 12:27)  POCT Blood Glucose.: 121 mg/dL (07 Jun 2024 08:50)    I&O's Summary    07 Jun 2024 07:01  -  08 Jun 2024 07:00  --------------------------------------------------------  IN: 0 mL / OUT: 600 mL / NET: -600 mL        PHYSICAL EXAM:  Vital Signs Last 24 Hrs  T(C): 36.5 (08 Jun 2024 06:58), Max: 37 (07 Jun 2024 14:35)  T(F): 97.7 (08 Jun 2024 06:58), Max: 98.6 (07 Jun 2024 14:35)  HR: 79 (08 Jun 2024 06:58) (72 - 80)  BP: 142/89 (08 Jun 2024 06:58) (122/77 - 142/98)  BP(mean): --  RR: 17 (08 Jun 2024 06:58) (17 - 18)  SpO2: 100% (08 Jun 2024 06:58) (98% - 100%)    Parameters below as of 08 Jun 2024 06:58  Patient On (Oxygen Delivery Method): room air        CONSTITUTIONAL: NAD; well-developed  HEENT: PERRL, clear conjunctiva  RESPIRATORY: Normal respiratory effort; lungs are clear to auscultation bilaterally; No Crackles/Rhonchi/Wheezing  CARDIOVASCULAR: Regular rate and rhythm, normal S1 and S2, no murmur/rub/gallop; No lower extremity edema; Peripheral pulses are 2+ bilaterally  ABDOMEN: Nontender to palpation, normoactive bowel sounds, no rebound/guarding; No hepatosplenomegaly  MUSCULOSKELETAL: no clubbing or cyanosis of digits; no joint swelling or tenderness to palpation  EXTREMITY: Lower extremities Non-tender to palpation; non-erythematous B/L  NEURO: A&Ox3; no focal deficits   PSYCH: normal mood; Affect appropirate    LABS:                        10.7   7.08  )-----------( 362      ( 07 Jun 2024 05:40 )             35.4     06-07    139  |  104  |  10  ----------------------------<  102<H>  3.7   |  22  |  0.56    Ca    9.0      07 Jun 2024 05:40  Phos  4.1     06-07  Mg     2.30     06-07    TPro  7.2  /  Alb  3.8  /  TBili  0.4  /  DBili  x   /  AST  12  /  ALT  10  /  AlkPhos  80  06-07          Urinalysis Basic - ( 07 Jun 2024 05:40 )    Color: x / Appearance: x / SG: x / pH: x  Gluc: 102 mg/dL / Ketone: x  / Bili: x / Urobili: x   Blood: x / Protein: x / Nitrite: x   Leuk Esterase: x / RBC: x / WBC x   Sq Epi: x / Non Sq Epi: x / Bacteria: x          RADIOLOGY & ADDITIONAL TESTS:  Results Reviewed:   Imaging Personally Reviewed:  Electrocardiogram Personally Reviewed:    COORDINATION OF CARE:  Care Discussed with Consultants/Other Providers [Y/N]:  Prior or Outpatient Records Reviewed [Y/N]:   ***********************************************  Tigre Lagunas MD  Internal Medicine   PGY1  ***********************************************      PROGRESS NOTE:     Patient is a 58y old  Female who presents with a chief complaint of Abdominal pain (07 Jun 2024 07:30)      SUBJECTIVE / OVERNIGHT EVENTS:    OVERNIGHT: No overnight events      Pt seen in AM at bedside, resting comfortably in bed, and does not appear to be in any acute distress. Mentions abdominal pain unchanged, though she looks more comfortable and breathing better today. When asked, pt denies any recent or active fever, chills, nausea, vomiting, headache, acute sob, chest pain, genitourinary sx, extremity pain or swelling.          MEDICATIONS  (STANDING):  amoxicillin 1000 milliGRAM(s) Oral two times a day  clarithromycin 500 milliGRAM(s) Oral two times a day  dextrose 50% Injectable 25 Gram(s) IV Push once  dextrose 50% Injectable 12.5 Gram(s) IV Push once  dextrose 50% Injectable 25 Gram(s) IV Push once  dextrose Oral Gel 15 Gram(s) Oral once  enoxaparin Injectable 40 milliGRAM(s) SubCutaneous every 24 hours  glucagon  Injectable 1 milliGRAM(s) IntraMuscular once  insulin lispro (ADMELOG) corrective regimen sliding scale   SubCutaneous every 6 hours  lactobacillus acidophilus 1 Tablet(s) Oral two times a day with meals  melatonin 3 milliGRAM(s) Oral at bedtime  nystatin Cream 1 Application(s) Topical two times a day  pantoprazole    Tablet 40 milliGRAM(s) Oral two times a day  polyethylene glycol 3350 17 Gram(s) Oral daily  senna 1 Tablet(s) Oral at bedtime    MEDICATIONS  (PRN):  aluminum hydroxide/magnesium hydroxide/simethicone Suspension 30 milliLiter(s) Oral every 4 hours PRN Dyspepsia  bisacodyl Suppository 10 milliGRAM(s) Rectal daily PRN Constipation  HYDROmorphone  Injectable 0.2 milliGRAM(s) IV Push every 6 hours PRN Severe Pain (7 - 10)      CAPILLARY BLOOD GLUCOSE      POCT Blood Glucose.: 114 mg/dL (08 Jun 2024 06:43)  POCT Blood Glucose.: 132 mg/dL (07 Jun 2024 17:37)  POCT Blood Glucose.: 116 mg/dL (07 Jun 2024 12:27)  POCT Blood Glucose.: 121 mg/dL (07 Jun 2024 08:50)    I&O's Summary    07 Jun 2024 07:01  -  08 Jun 2024 07:00  --------------------------------------------------------  IN: 0 mL / OUT: 600 mL / NET: -600 mL        PHYSICAL EXAM:  Vital Signs Last 24 Hrs  T(C): 36.5 (08 Jun 2024 06:58), Max: 37 (07 Jun 2024 14:35)  T(F): 97.7 (08 Jun 2024 06:58), Max: 98.6 (07 Jun 2024 14:35)  HR: 79 (08 Jun 2024 06:58) (72 - 80)  BP: 142/89 (08 Jun 2024 06:58) (122/77 - 142/98)  BP(mean): --  RR: 17 (08 Jun 2024 06:58) (17 - 18)  SpO2: 100% (08 Jun 2024 06:58) (98% - 100%)    Parameters below as of 08 Jun 2024 06:58  Patient On (Oxygen Delivery Method): room air        CONSTITUTIONAL: NAD; well-developed  HEENT: PERRL, clear conjunctiva  RESPIRATORY: Normal respiratory effort; lungs are clear to auscultation bilaterally; No Crackles/Rhonchi/Wheezing  CARDIOVASCULAR: Regular rate and rhythm, normal S1 and S2, no murmur/rub/gallop; No lower extremity edema; Peripheral pulses are 2+ bilaterally  ABDOMEN: non distended, tenderness to palpation primarily in lower quadrant, no rebound/guarding; No hepatosplenomegaly  MUSCULOSKELETAL: no clubbing or cyanosis of digits; no joint swelling or tenderness to palpation  EXTREMITY: Lower extremities Non-tender to palpation; non-erythematous B/L  NEURO: A&Ox3; no focal deficits   PSYCH: normal mood; Affect appropirate    LABS:                        10.7   7.08  )-----------( 362      ( 07 Jun 2024 05:40 )             35.4     06-07    139  |  104  |  10  ----------------------------<  102<H>  3.7   |  22  |  0.56    Ca    9.0      07 Jun 2024 05:40  Phos  4.1     06-07  Mg     2.30     06-07    TPro  7.2  /  Alb  3.8  /  TBili  0.4  /  DBili  x   /  AST  12  /  ALT  10  /  AlkPhos  80  06-07          Urinalysis Basic - ( 07 Jun 2024 05:40 )    Color: x / Appearance: x / SG: x / pH: x  Gluc: 102 mg/dL / Ketone: x  / Bili: x / Urobili: x   Blood: x / Protein: x / Nitrite: x   Leuk Esterase: x / RBC: x / WBC x   Sq Epi: x / Non Sq Epi: x / Bacteria: x          RADIOLOGY & ADDITIONAL TESTS:  Results Reviewed:   Imaging Personally Reviewed:  Electrocardiogram Personally Reviewed:    COORDINATION OF CARE:  Care Discussed with Consultants/Other Providers [Y/N]:  Prior or Outpatient Records Reviewed [Y/N]:

## 2024-06-08 NOTE — DISCHARGE NOTE PROVIDER - HOSPITAL COURSE
HPI  58-year-old woman with history of diabetes, hypertension, hyperlipidemia, cholelithiasis, complex cystic right adnexal mass presents to the ED with abdominal pain and shortness of breath worsening over the past day. Mentions since she had a fall in Jan 2024 has had severe and constant abdominal pain in upper abdomen. Describes it as sharp 10/10 pain in b/l upper abdominal quadrants thats worse when palpating abdomen. Associated with burning sensation in sternum region. Pain is sometimes worse with eating. Constant throughout the day.  Mentions to have had multiple ED visits since then for abdominal pain, last being in May 1st. Imaging found at that time solid echogenic mass that was described as cystic in right adnexal region. Was told to follow-up with outpatient Gyn, however, has been unable to. Also was seen by GI who completed EGD/Colonoscopy in May, which showed gastritis, hemorrhoids and single non-bleeding polyp in the ascending colon and s/p biopsy taken. Patient does not have with her biopsy results. Currently denies fevers, chills, runny nose, sore throat, changes in BMs or urinary symptoms.     ED course: vitals stable, satting fine on RA. Labs were unremarkable, LFTs wnl. CT chest confirmed no PE seen. CT A/P showed complex R adnexal lesion that may be subserosal fibroid vs ovarian lesion US transvaginal obtained which showed echogenic R ovarian lesion suggestive of ovarian cyst.  US abdomen was obtained, which showed cholelithiasis and hepatic steatosis but no cholecystitis. Received Famotidine 20, Morphine 4 mg, Maalox, and Tylenol     Hospital Course  Admitted to medicine service for further management and workup of abdominal pain. Surgery was consulted and HIDA scan was recommended. HIDA scan was negative for acute cholecystitis. Recommended close outpatient follow-up for outpatient surgery for management of biliary colic.  Gynecology consulted for assessment of abdominal pain i/s/o known adnexal mass. 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 Ovarian origin and ovarian dermoid are thought to be less likely. Per gynecology, unlikely that adnexal mass is source of abdominal pain. Cancer markers sent (CA 19-9, CEA, )--> all negative. Recommended close outpatient follow-up for possible surgical intervention. EKG and TTE were obtained atypical ACS as source of abdominal pain, all of which were wnl and unlikely source. EGD/Colonoscopy obtained outpatient and records were provided that showed gastritis w/ biopsies pending to r/o H. pylori. Given no other source of abdominal pain, empirically treated for H. pylori infection, treated with Amoxicillin 1g BID, Clarithromycin 500MG BID, and protonix 500mg BID. Patient's abdominal pain improved after initiating treatment. Patient medically optimized for discharge with plans for close surgical       Important Medicine Changes and Reasons  -Medications stopped: Amlodipine 5mg held i/s/o hypotension   -Medications started: Amoxicillin 1g BID, Clarithromycin 500MG BID, and protonix 500mg BID for additional 9 days for total of 14 days total    Active or Pending Issues Requiring Follow-up  [ ] Follow-up with Gynecology team for further management of adnexal mass  [ ] Follow-up with outpatient GI team to follow-up on biopsy's taken during EGD/colonoscopy     Advanced Directives  Full Code    Discharge Diagnosis   Abdominal Pain HPI  58-year-old woman with history of diabetes, hypertension, hyperlipidemia, cholelithiasis, complex cystic right adnexal mass presents to the ED with abdominal pain and shortness of breath worsening over the past day. Mentions since she had a fall in Jan 2024 has had severe and constant abdominal pain in upper abdomen. Describes it as sharp 10/10 pain in b/l upper abdominal quadrants thats worse when palpating abdomen. Associated with burning sensation in sternum region. Pain is sometimes worse with eating. Constant throughout the day.  Mentions to have had multiple ED visits since then for abdominal pain, last being in May 1st. Imaging found at that time solid echogenic mass that was described as cystic in right adnexal region. Was told to follow-up with outpatient Gyn, however, has been unable to. Also was seen by GI who completed EGD/Colonoscopy in May, which showed gastritis, hemorrhoids and single non-bleeding polyp in the ascending colon and s/p biopsy taken. Patient does not have with her biopsy results. Currently denies fevers, chills, runny nose, sore throat, changes in BMs or urinary symptoms.     ED course: vitals stable, satting fine on RA. Labs were unremarkable, LFTs wnl. CT chest confirmed no PE seen. CT A/P showed complex R adnexal lesion that may be subserosal fibroid vs ovarian lesion US transvaginal obtained which showed echogenic R ovarian lesion suggestive of ovarian cyst.  US abdomen was obtained, which showed cholelithiasis and hepatic steatosis but no cholecystitis. Received Famotidine 20, Morphine 4 mg, Maalox, and Tylenol     Hospital Course  Admitted to medicine service for further management and workup of abdominal pain. Surgery was consulted and HIDA scan was recommended. HIDA scan was negative for acute cholecystitis. Recommended close outpatient follow-up for outpatient surgery for management of biliary colic.  Gynecology consulted for assessment of abdominal pain i/s/o known adnexal mass. 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 Ovarian origin and ovarian dermoid are thought to be less likely. Per gynecology, unlikely that adnexal mass is source of abdominal pain. Cancer markers sent (CA 19-9, CEA, )--> all negative. Recommended close outpatient follow-up for possible surgical intervention. EKG and TTE were obtained atypical ACS as source of abdominal pain, all of which were wnl and unlikely source. EGD/Colonoscopy obtained outpatient and records were provided that showed gastritis w/ biopsies pending to r/o H. pylori. Given no other source of abdominal pain, empirically treated for H. pylori infection, treated with Amoxicillin 1g BID, Clarithromycin 500MG BID, and protonix 500mg BID. Patient's abdominal pain improved after initiating treatment. Patient medically optimized for discharge with plans for close surgical       Important Medicine Changes and Reasons  -Medications stopped: Amlodipine 5mg held i/s/o hypotension   -Medications started: Amoxicillin 1g BID, Clarithromycin 500MG BID, and protonix 500mg BID for additional 8 days for total of 14 days total;     Active or Pending Issues Requiring Follow-up  [ ] Follow-up with Gynecology team for further management of adnexal mass  [ ] Follow-up with outpatient GI team to follow-up on biopsy's taken during EGD/colonoscopy   [ ] Follow-up with PCP regarding resuming home amlodipine 5mg     Advanced Directives  Full Code    Discharge Diagnosis   Abdominal Pain

## 2024-06-08 NOTE — DISCHARGE NOTE PROVIDER - NSDCCPCAREPLAN_GEN_ALL_CORE_FT
PRINCIPAL DISCHARGE DIAGNOSIS  Diagnosis: Abdominal pain  Assessment and Plan of Treatment: You were admitted due to severe abdominal pain, which required a comprehensive evaluation and follow-up. During your stay, a surgical consultation raised concerns about possible inflammation in your gallbladder. Imaging studies were performed, which fortunately did not show any biliary obstruction or acute cholecystitis. Given your history of an adnexal mass, the gynecology team was also consulted to assess if this could be the source of your pain. However, they did not believe that the adnexal mass was causing your abdominal discomfort. An outpatient esophagogastroduodenoscopy (EGD) was conducted and revealed gastritis. Biopsies were taken to rule out an infection caused by Helicobacter pylori (H. pylori), a type of bacteria that can lead to stomach inflammation and ulcers. Given the lack of other identifiable causes for your severe abdominal pain, we decided to treat you empirically for an H. pylori infection. You responded well to the treatment, and your abdominal pain has improved. It is essential that you complete the remaining 9 days of treatment after discharge to ensure the infection is fully eradicated. For further assessment and potential intervention, please schedule follow-up appointments with your outpatient gynecology and gastroenterology teams. nShould you experience any alarming symptoms such as worsening abdominal pain, fever, jaundice (yellowing of the skin or eyes), vomiting, or any other concerning changes, please return to the Emergency Room immediately.     PRINCIPAL DISCHARGE DIAGNOSIS  Diagnosis: Abdominal pain  Assessment and Plan of Treatment: You were admitted due to severe abdominal pain, which required a comprehensive evaluation and follow-up. During your stay, a surgical consultation raised concerns about possible inflammation in your gallbladder. Imaging studies were performed, which fortunately did not show any biliary obstruction or acute cholecystitis. Given your history of an adnexal mass, the gynecology team was also consulted to assess if this could be the source of your pain. However, they did not believe that the adnexal mass was causing your abdominal discomfort. An outpatient esophagogastroduodenoscopy (EGD) was conducted and revealed gastritis, inflammation of stomach. Biopsies were taken to rule out an infection caused by Helicobacter pylori (H. pylori), a type of bacteria that can lead to stomach inflammation and ulcers. Given the lack of other identifiable causes for your severe abdominal pain, we decided to treat you empirically for an H. pylori infection. You responded well to the treatment, and your abdominal pain has improved. It is essential that you complete the remaining 9 days of treatment after discharge to ensure the infection is fully eradicated. For further assessment and potential intervention, please schedule follow-up appointments with your outpatient gynecology and gastroenterology teams. Should you experience any alarming symptoms such as worsening abdominal pain, fever, jaundice (yellowing of the skin or eyes), vomiting, or any other concerning changes, please return to the Emergency Room immediately.     PRINCIPAL DISCHARGE DIAGNOSIS  Diagnosis: Abdominal pain  Assessment and Plan of Treatment: You were admitted due to severe abdominal pain, which required a comprehensive evaluation and follow-up. During your stay, a surgical consultation raised concerns about possible inflammation in your gallbladder. Imaging studies were performed, which fortunately did not show any biliary obstruction or acute cholecystitis. Given your history of an adnexal mass, the gynecology team was also consulted to assess if this could be the source of your pain. However, they did not believe that the adnexal mass was causing your abdominal discomfort. An outpatient esophagogastroduodenoscopy (EGD) was conducted and revealed gastritis, inflammation of stomach. Biopsies were taken to rule out an infection caused by Helicobacter pylori (H. pylori), a type of bacteria that can lead to stomach inflammation and ulcers. Given the lack of other identifiable causes for your severe abdominal pain, we decided to treat you empirically for an H. pylori infection. You responded well to the treatment, and your abdominal pain has improved. It is essential that you complete the remaining 8 days of treatment after discharge to ensure the infection is fully eradicated. For further assessment and potential intervention, please schedule follow-up appointments with your outpatient gynecology and gastroenterology teams. Should you experience any alarming symptoms such as worsening abdominal pain, fever, jaundice (yellowing of the skin or eyes), vomiting, or any other concerning changes, please return to the Emergency Room immediately.

## 2024-06-08 NOTE — PROGRESS NOTE ADULT - ASSESSMENT
58-year-old woman with history of diabetes, hypertension, hyperlipidemia, cholelithiasis, complex cystic right adnexal mass presents to the ED with abdominal pain and shortness of breath found to have known adnexal mass and concerns for biliary colic vs cholecystitis admitted for further work-up and management. HIDA negative for acute choley, no surgical intervention. MRI showed Right adnexal lipid-containing mass, unclear if cause of abdominal pain. Gyn consulted, no inpatient intervention and unlikely cause of abdominal pain. EKG and TTE negative for atypical ACS. Started on empiric triple therapy of H. pylori with improvement in abdominal pain.

## 2024-06-08 NOTE — DISCHARGE NOTE NURSING/CASE MANAGEMENT/SOCIAL WORK - PATIENT PORTAL LINK FT
You can access the FollowMyHealth Patient Portal offered by [facility] by registering at the following website: [email or web address]. By joining [facility]’s FollowMyHealth portal, you will also be able to view your health information using other applications (apps) compatible with our system.

## 2024-06-08 NOTE — PROVIDER CONTACT NOTE (OTHER) - ASSESSMENT
patient denies dypne, dizziness, syncope or any compliant
pt educated on importance. no acute ss of distress.
no acute ss of distress, vitally stable. pt anxious.
Patient is uncomfortable getting BG checked q6hrs. As per last several BG level is within WNL.

## 2024-06-08 NOTE — PROVIDER CONTACT NOTE (OTHER) - SITUATION
patient has a low blood pressure of 89/68
pt states she is having anxiety, has hx of severe anxiety, and takes a med at home for this.
Patient was initially admitted due to SOB
pt refusing scd's. pt reports it is escalating her anxiety and she cannot sleep.

## 2024-06-08 NOTE — DISCHARGE NOTE NURSING/CASE MANAGEMENT/SOCIAL WORK - NSDCFUADDAPPT_GEN_ALL_CORE_FT
APPTS ARE READY TO BE MADE: [x] YES    Best Family or Patient Contact (if needed): Self    Additional Information about above appointments (if needed):    1: Follow-up with Gastroenterology team within 1 week post-discharge  2: Establish care with Dr. Lagunas outpatient 2 weeks post discharge

## 2024-06-08 NOTE — DISCHARGE NOTE PROVIDER - NSFOLLOWUPCLINICS_GEN_ALL_ED_FT
[facility]  Gastroenterology  [address] [place] [id number]  Phone: [phone]  Fax:   Follow Up Time: 1 week

## 2024-06-08 NOTE — DISCHARGE NOTE PROVIDER - ATTENDING DISCHARGE PHYSICAL EXAMINATION:
Constitutional: NAD, resting comfortably   Head: NC/AT  Eyes: anicteric sclera  Respiratory: clear to ascultation b/l, No wheezing or rhonchi, no retractions   Cardiac: +S1/S2; RRR; no M/R/G  Gastrointestinal: abdomen soft, non-distended, no rebound or guarding; +BSx4  Extremities: no peripheral edema  Musculoskeletal: NROM x4; no joint swelling, tenderness or erythema  Vascular: 2+ radial, DP pulses B/L  Neurologic: AAOx3; moving all extremities   Psychiatric: affect and characteristics of appearance, verbalizations, behaviors are appropriate Constitutional: NAD, standing comfortably   Head: NC/AT  Eyes: anicteric sclera  Respiratory: unlabored breathing, speaking in full sentences, no audible wheezing noted   Cardiac: chest clear   Gastrointestinal: abdomen non-distended, no guarding  Extremities: no peripheral edema  Musculoskeletal: no observable joint swelling or erythema  Neurologic: AAOx3; moving all extremities, gait normal    Psychiatric: affect and characteristics of appearance, verbalizations, behaviors are appropriate

## 2024-06-08 NOTE — DISCHARGE NOTE PROVIDER - NSDCFUADDAPPT_GEN_ALL_CORE_FT
APPTS ARE READY TO BE MADE: [x] YES    Best Family or Patient Contact (if needed): Self    Additional Information about above appointments (if needed):    1: Follow-up with Gastroenterology team within 1 week post-discharge  2: Establish care with Dr. Laugnas outpatient 2 weeks post discharge     APPTS ARE READY TO BE MADE: [x] YES    Best Family or Patient Contact (if needed): Self    Additional Information about above appointments (if needed):    1: Follow-up with Gastroenterology team within 1 week post-discharge  2: Establish care with Dr. Lagunas outpatient 2 weeks post discharge    Patient advised they did not want to proceed with scheduling appointments with the providers on their referrals. They will coordinate care on their own.

## 2024-06-08 NOTE — DISCHARGE NOTE PROVIDER - NSDCMRMEDTOKEN_GEN_ALL_CORE_FT
amoxicillin 500 mg oral capsule: 2 cap(s) orally 2 times a day  clarithromycin 500 mg oral tablet: 1 tab(s) orally 2 times a day  lactobacillus acidophilus oral capsule: 1 cap(s) orally once a day  losartan 25 mg oral tablet: 1 tab(s) orally once a day  metFORMIN 750 mg oral tablet, extended release: 1 tab(s) orally 2 times a day  pantoprazole 40 mg oral delayed release tablet: 1 tab(s) orally 2 times a day   amoxicillin 500 mg oral capsule: 2 cap(s) orally 2 times a day  clarithromycin 500 mg oral tablet: 1 tab(s) orally 2 times a day  lactobacillus acidophilus oral capsule: 1 cap(s) orally once a day  losartan 25 mg oral tablet: 1 tab(s) orally once a day  metFORMIN 750 mg oral tablet, extended release: 1 tab(s) orally 2 times a day  pantoprazole 40 mg oral delayed release tablet: 1 tab(s) orally 2 times a day  Ventolin HFA 90 mcg/inh inhalation aerosol: 2 puff(s) inhaled

## 2024-06-09 LAB
CULTURE RESULTS: SIGNIFICANT CHANGE UP
CULTURE RESULTS: SIGNIFICANT CHANGE UP
SPECIMEN SOURCE: SIGNIFICANT CHANGE UP
SPECIMEN SOURCE: SIGNIFICANT CHANGE UP

## 2024-06-12 ENCOUNTER — RESULT REVIEW (OUTPATIENT)
Age: 59
End: 2024-06-12

## 2024-06-12 ENCOUNTER — APPOINTMENT (OUTPATIENT)
Dept: OBGYN | Facility: HOSPITAL | Age: 59
End: 2024-06-12
Payer: MEDICAID

## 2024-06-12 ENCOUNTER — OUTPATIENT (OUTPATIENT)
Dept: OUTPATIENT SERVICES | Facility: HOSPITAL | Age: 59
LOS: 1 days | End: 2024-06-12
Payer: MEDICAID

## 2024-06-12 VITALS
BODY MASS INDEX: 26.5 KG/M2 | HEART RATE: 76 BPM | SYSTOLIC BLOOD PRESSURE: 122 MMHG | HEIGHT: 62 IN | WEIGHT: 144 LBS | DIASTOLIC BLOOD PRESSURE: 82 MMHG | TEMPERATURE: 98.1 F

## 2024-06-12 DIAGNOSIS — E78.5 HYPERLIPIDEMIA, UNSPECIFIED: ICD-10-CM

## 2024-06-12 DIAGNOSIS — R30.0 DYSURIA: ICD-10-CM

## 2024-06-12 DIAGNOSIS — I10 ESSENTIAL (PRIMARY) HYPERTENSION: ICD-10-CM

## 2024-06-12 DIAGNOSIS — E11.9 TYPE 2 DIABETES MELLITUS W/OUT COMPLICATIONS: ICD-10-CM

## 2024-06-12 DIAGNOSIS — K29.70 GASTRITIS, UNSPECIFIED, W/OUT BLEEDING: ICD-10-CM

## 2024-06-12 DIAGNOSIS — R19.00 INTRA-ABDOMINAL AND PELVIC SWELLING, MASS AND LUMP, UNSPECIFIED SITE: ICD-10-CM

## 2024-06-12 LAB
APPEARANCE UR: CLEAR — SIGNIFICANT CHANGE UP
BACTERIA # UR AUTO: NEGATIVE /HPF — SIGNIFICANT CHANGE UP
BILIRUB UR-MCNC: NEGATIVE — SIGNIFICANT CHANGE UP
CAST: 0 /LPF — SIGNIFICANT CHANGE UP (ref 0–4)
COLOR SPEC: YELLOW — SIGNIFICANT CHANGE UP
DIFF PNL FLD: NEGATIVE — SIGNIFICANT CHANGE UP
GLUCOSE UR QL: NEGATIVE MG/DL — SIGNIFICANT CHANGE UP
HCG UR QL: NEGATIVE
KETONES UR-MCNC: NEGATIVE MG/DL — SIGNIFICANT CHANGE UP
LEUKOCYTE ESTERASE UR-ACNC: NEGATIVE — SIGNIFICANT CHANGE UP
NITRITE UR-MCNC: NEGATIVE — SIGNIFICANT CHANGE UP
PH UR: 8 — SIGNIFICANT CHANGE UP (ref 5–8)
PROT UR-MCNC: NEGATIVE MG/DL — SIGNIFICANT CHANGE UP
QUALITY CONTROL: YES
RBC CASTS # UR COMP ASSIST: 0 /HPF — SIGNIFICANT CHANGE UP (ref 0–4)
SP GR SPEC: 1.01 — SIGNIFICANT CHANGE UP (ref 1–1.03)
SQUAMOUS # UR AUTO: 10 /HPF — HIGH (ref 0–5)
UROBILINOGEN FLD QL: 0.2 MG/DL — SIGNIFICANT CHANGE UP (ref 0.2–1)
WBC UR QL: 1 /HPF — SIGNIFICANT CHANGE UP (ref 0–5)

## 2024-06-12 PROCEDURE — 58100 BIOPSY OF UTERUS LINING: CPT

## 2024-06-12 PROCEDURE — 88305 TISSUE EXAM BY PATHOLOGIST: CPT | Mod: 26

## 2024-06-12 PROCEDURE — 99213 OFFICE O/P EST LOW 20 MIN: CPT | Mod: 25,GC

## 2024-06-13 DIAGNOSIS — R30.0 DYSURIA: ICD-10-CM

## 2024-06-13 LAB — HPV HIGH+LOW RISK DNA PNL CVX: SIGNIFICANT CHANGE UP

## 2024-06-14 ENCOUNTER — NON-APPOINTMENT (OUTPATIENT)
Age: 59
End: 2024-06-14

## 2024-06-15 PROBLEM — R19.00 PELVIC MASS: Status: ACTIVE | Noted: 2024-06-15

## 2024-06-15 PROBLEM — I10 CHRONIC HYPERTENSION: Status: ACTIVE | Noted: 2024-06-15

## 2024-06-15 PROBLEM — K29.70 GASTRITIS: Status: ACTIVE | Noted: 2024-06-15

## 2024-06-15 PROBLEM — R30.0 DYSURIA: Status: ACTIVE | Noted: 2024-06-12

## 2024-06-15 PROBLEM — E11.9 TYPE 2 DIABETES MELLITUS: Status: ACTIVE | Noted: 2024-06-15

## 2024-06-15 PROBLEM — E78.5 HYPERLIPIDEMIA: Status: ACTIVE | Noted: 2024-06-15

## 2024-06-16 LAB — CYTOLOGY SPEC DOC CYTO: SIGNIFICANT CHANGE UP

## 2024-06-17 DIAGNOSIS — R19.00 INTRA-ABDOMINAL AND PELVIC SWELLING, MASS AND LUMP, UNSPECIFIED SITE: ICD-10-CM

## 2024-06-18 LAB — SURGICAL PATHOLOGY STUDY: SIGNIFICANT CHANGE UP

## 2024-06-19 NOTE — REVIEW OF SYSTEMS
[Abdominal Pain] : abdominal pain [Constipation] : constipation [Heartburn] : heartburn [Bloating] : bloating [Pelvic pain] : pelvic pain [Genital Rash/Irritation] : genital rash/irritation [Negative] : Heme/Lymph [Diarrhea] : diarrhea [Vomiting] : no vomiting [Melena] : no melena [Nausea] : no nausea [Urgency] : no urgency [Frequency] : no frequency [Incontinence] : no incontinence [Urethral Discharge] : no urethral discharge [Abn Vaginal bleeding] : no abnormal vaginal bleeding [CVA Pain] : no CVA pain

## 2024-06-19 NOTE — HISTORY OF PRESENT ILLNESS
[FreeTextEntry1] : 57 y/o  post-menopausal female w/ hx of fibroids, ovarian cysts, gastritis who originally presented to [facility] ED with abdominal pain for the past 2 months now s/p recent admission for workup of RUQ pain with diagnosis of H pylori gastritis and cholelithiasis. GYN was consulted while inpatient 2/2 lower abdominal pain and a right adnexal mass measuring 4.5cm x 4.1cm x 4.0cm with heterogeneity concerning for a lipoleiomyoma vs a dermoid ovarian cyst. Tumor markers (CA-125, CA 19-9, CEA) were all negative inpatient. Patient states that she has never had postmenopausal bleeding though states that she has had increasing constipation and suprapubic pain over the last few weeks. She states the pain improved after she had a bowel movement but she has been unable to have regular bowel movements after her most recent admission. She has had a recent colonoscopy and endoscopy which showed gastritis, hemorrhoids and a polyp that was taken for biopsy. She denies current nausea, SOB, chest pain, fevers, chills, vomiting, dysuria. She has 6/10 pain today though able to tolerate the pain without medication. Patient states that she developed a rash in her groin during her recent admission.  Name of GYN Physician: [facility] OBHx: 1x SAB,  x3 GynHx: Known hx of fibroids and cysts. Denies endometriosis, STI's. States had a pap smear in May of this year but is unsure whether the result was normal or not. PMH: DM, HLD, HTN PSH: Denies Meds: Metformin, Amlodipine, Losartan, Triple Therapy for H Pylori Allx: NKDA Social History: Denies smoking use, drug use, alcohol use

## 2024-06-19 NOTE — PLAN
[FreeTextEntry1] : 59 y/o  post-menopausal female w/ hx of fibroids, ovarian cysts, gastritis presenting with lower abdominal pain and a right adnexal mass measuring 4.5cm x 4.1cm x 4.0cm with heterogeneity concerning for a lipoleiomyoma vs a dermoid ovarian cyst. Patient requests a hysterectomy for definitive management of known history of fibroids. Patient denies hx of postmenopausal bleeding and is open to a laparoscopic approach for hysterectomy including a TLH, BS, Cystoscopy, possible ovarian cystectomy, possible unilateral oophorectomy, possible exploratory laparotomy after receiving counseling on the risks vs benefits of an open vs laparoscopic approach. EMBx and pap smear/HPV testing performed during this visit, both noted to be uncomplicated.   -booking form submitted for TLH, BS, Cysto, possible cystectomy vs oophorectomy, possible ex-lap.  -Patient to obtain pre-surgical clearance from PCP for hx of HTN and HLD.   Patient seen, counseled and d/w Dr. Queen, DAVIDS Fellow  Mary Rangel, PGY-3  MIGS Fellow Addendum Patient seen and examined with resident. Agree with above. 59yo P3 with longstanding history of uterine myomas. During recent work up for abdominal pain she was noted to have a ~4.5cm right adnexal mass. Tumor markers collected were wnl. Today patient is asymptomatic. Imaging reviewed with patient and team. Mass appears to look like a broad ligament myoma however, discussed would not be certain until surgical exploration. Discuss options for treatment including expectant management, mass removal or hysterectomy. Patient adamantly desired definitive management via hysterectomy. Discussed r/b/a of TLH, BS as well as possible unilateral or BSO, or exploratory laparotomy. Questions and concerns addressed to patients satisfaction. EMBx and Pap collected today. Procedure to be booked once clearance from PCP obtained. D/W Dr. Kevon Queen, FMIGS-2

## 2024-06-19 NOTE — PHYSICAL EXAM
[Chaperone Present] : A chaperone was present in the examining room during all aspects of the physical examination [[id number]] : A chaperone was present during the pelvic exam. [Appropriately responsive] : appropriately responsive [Alert] : alert [No Acute Distress] : no acute distress [Soft] : soft [Non-distended] : non-distended [Oriented x3] : oriented x3 [Atrophy] : atrophy [No Bleeding] : There was no active vaginal bleeding [Normal] : normal [Normal Position] : in a normal position [FreeTextEntry7] : mildly tender in suprapubic region [FreeTextEntry1] : Noted dry scaling skin on buttock and between legs [FreeTextEntry2] : two labial inclusion cysts noted on right labia majora [FreeTextEntry6] : right adnexal fullness appreciated

## 2024-06-19 NOTE — REASON FOR VISIT
[Consultation] : consultation for [Pelvic Pain] : pelvic pain [Spouse] : spouse [FreeTextEntry2] : surgical consultation for management of right pelvic mass

## 2024-06-19 NOTE — PROCEDURE
[Cervical Pap Smear] : cervical Pap smear [Liquid Base] : liquid base [Endometrial Biopsy] : Endometrial biopsy [Time out performed] : Pre-procedure time out performed.  Patient's name, date of birth and procedure confirmed. [Consent Obtained] : Consent obtained [Risks] : risks [Benefits] : benefits [Alternatives] : alternatives [Patient] : patient [Negative] : negative pregnancy test [None] : none [Easy Passage] : Easy passage [Anteverted] : anteverted [Scant] : scant [Specimen Collected] : collected [Sent to Pathology] : placed in buffered formalin and sent for pathology [Tolerated Well] : Patient tolerated the procedure well [No Complications] : No complications [de-identified] : pre op eval for hysterectomy [de-identified] : postmenopausal woman [de-identified] : Naomi white

## 2024-06-23 LAB — CYTOLOGY CVX/VAG DOC THIN PREP: NORMAL

## 2024-06-27 ENCOUNTER — NON-APPOINTMENT (OUTPATIENT)
Age: 59
End: 2024-06-27

## 2024-07-17 ENCOUNTER — NON-APPOINTMENT (OUTPATIENT)
Age: 59
End: 2024-07-17

## 2024-12-13 ENCOUNTER — OUTPATIENT (OUTPATIENT)
Dept: OUTPATIENT SERVICES | Facility: HOSPITAL | Age: 59
LOS: 1 days | End: 2024-12-13

## 2024-12-13 VITALS
RESPIRATION RATE: 18 BRPM | OXYGEN SATURATION: 98 % | TEMPERATURE: 98 F | HEART RATE: 75 BPM | WEIGHT: 136.03 LBS | SYSTOLIC BLOOD PRESSURE: 118 MMHG | DIASTOLIC BLOOD PRESSURE: 81 MMHG | HEIGHT: 62 IN

## 2024-12-13 DIAGNOSIS — Z98.890 OTHER SPECIFIED POSTPROCEDURAL STATES: Chronic | ICD-10-CM

## 2024-12-13 DIAGNOSIS — R19.09 OTHER INTRA-ABDOMINAL AND PELVIC SWELLING, MASS AND LUMP: ICD-10-CM

## 2024-12-13 DIAGNOSIS — I10 ESSENTIAL (PRIMARY) HYPERTENSION: ICD-10-CM

## 2024-12-13 DIAGNOSIS — Z90.49 ACQUIRED ABSENCE OF OTHER SPECIFIED PARTS OF DIGESTIVE TRACT: Chronic | ICD-10-CM

## 2024-12-13 DIAGNOSIS — E11.9 TYPE 2 DIABETES MELLITUS WITHOUT COMPLICATIONS: ICD-10-CM

## 2024-12-13 LAB
A1C WITH ESTIMATED AVERAGE GLUCOSE RESULT: 5.8 % — HIGH (ref 4–5.6)
ANION GAP SERPL CALC-SCNC: 10 MMOL/L — SIGNIFICANT CHANGE UP (ref 7–14)
APPEARANCE UR: CLEAR — SIGNIFICANT CHANGE UP
BASOPHILS # BLD AUTO: 0.03 K/UL — SIGNIFICANT CHANGE UP (ref 0–0.2)
BASOPHILS NFR BLD AUTO: 0.4 % — SIGNIFICANT CHANGE UP (ref 0–2)
BILIRUB UR-MCNC: NEGATIVE — SIGNIFICANT CHANGE UP
BLD GP AB SCN SERPL QL: NEGATIVE — SIGNIFICANT CHANGE UP
BUN SERPL-MCNC: 9 MG/DL — SIGNIFICANT CHANGE UP (ref 7–23)
CALCIUM SERPL-MCNC: 9.3 MG/DL — SIGNIFICANT CHANGE UP (ref 8.4–10.5)
CHLORIDE SERPL-SCNC: 104 MMOL/L — SIGNIFICANT CHANGE UP (ref 98–107)
CO2 SERPL-SCNC: 29 MMOL/L — SIGNIFICANT CHANGE UP (ref 22–31)
COLOR SPEC: YELLOW — SIGNIFICANT CHANGE UP
CREAT SERPL-MCNC: 0.47 MG/DL — LOW (ref 0.5–1.3)
DIFF PNL FLD: NEGATIVE — SIGNIFICANT CHANGE UP
EGFR: 110 ML/MIN/1.73M2 — SIGNIFICANT CHANGE UP
EOSINOPHIL # BLD AUTO: 0.08 K/UL — SIGNIFICANT CHANGE UP (ref 0–0.5)
EOSINOPHIL NFR BLD AUTO: 1.2 % — SIGNIFICANT CHANGE UP (ref 0–6)
ESTIMATED AVERAGE GLUCOSE: 120 — SIGNIFICANT CHANGE UP
GLUCOSE SERPL-MCNC: 98 MG/DL — SIGNIFICANT CHANGE UP (ref 70–99)
GLUCOSE UR QL: NEGATIVE MG/DL — SIGNIFICANT CHANGE UP
HCT VFR BLD CALC: 36.4 % — SIGNIFICANT CHANGE UP (ref 34.5–45)
HGB BLD-MCNC: 11.3 G/DL — LOW (ref 11.5–15.5)
IMM GRANULOCYTES NFR BLD AUTO: 0.1 % — SIGNIFICANT CHANGE UP (ref 0–0.9)
KETONES UR-MCNC: NEGATIVE MG/DL — SIGNIFICANT CHANGE UP
LEUKOCYTE ESTERASE UR-ACNC: NEGATIVE — SIGNIFICANT CHANGE UP
LYMPHOCYTES # BLD AUTO: 2.4 K/UL — SIGNIFICANT CHANGE UP (ref 1–3.3)
LYMPHOCYTES # BLD AUTO: 35 % — SIGNIFICANT CHANGE UP (ref 13–44)
MCHC RBC-ENTMCNC: 24.9 PG — LOW (ref 27–34)
MCHC RBC-ENTMCNC: 31 G/DL — LOW (ref 32–36)
MCV RBC AUTO: 80.4 FL — SIGNIFICANT CHANGE UP (ref 80–100)
MONOCYTES # BLD AUTO: 0.29 K/UL — SIGNIFICANT CHANGE UP (ref 0–0.9)
MONOCYTES NFR BLD AUTO: 4.2 % — SIGNIFICANT CHANGE UP (ref 2–14)
NEUTROPHILS # BLD AUTO: 4.04 K/UL — SIGNIFICANT CHANGE UP (ref 1.8–7.4)
NEUTROPHILS NFR BLD AUTO: 59.1 % — SIGNIFICANT CHANGE UP (ref 43–77)
NITRITE UR-MCNC: NEGATIVE — SIGNIFICANT CHANGE UP
PH UR: 8 — SIGNIFICANT CHANGE UP (ref 5–8)
PLATELET # BLD AUTO: 315 K/UL — SIGNIFICANT CHANGE UP (ref 150–400)
POTASSIUM SERPL-MCNC: 4.2 MMOL/L — SIGNIFICANT CHANGE UP (ref 3.5–5.3)
POTASSIUM SERPL-SCNC: 4.2 MMOL/L — SIGNIFICANT CHANGE UP (ref 3.5–5.3)
PROT UR-MCNC: NEGATIVE MG/DL — SIGNIFICANT CHANGE UP
RBC # BLD: 4.53 M/UL — SIGNIFICANT CHANGE UP (ref 3.8–5.2)
RBC # FLD: 14.9 % — HIGH (ref 10.3–14.5)
RH IG SCN BLD-IMP: POSITIVE — SIGNIFICANT CHANGE UP
RH IG SCN BLD-IMP: POSITIVE — SIGNIFICANT CHANGE UP
SODIUM SERPL-SCNC: 143 MMOL/L — SIGNIFICANT CHANGE UP (ref 135–145)
SP GR SPEC: 1.01 — SIGNIFICANT CHANGE UP (ref 1–1.03)
UROBILINOGEN FLD QL: 0.2 MG/DL — SIGNIFICANT CHANGE UP (ref 0.2–1)
WBC # BLD: 6.85 K/UL — SIGNIFICANT CHANGE UP (ref 3.8–10.5)
WBC # FLD AUTO: 6.85 K/UL — SIGNIFICANT CHANGE UP (ref 3.8–10.5)

## 2024-12-13 NOTE — H&P PST ADULT - LAST ECHOCARDIOGRAM
Left ventricular systolic function is normal with an ejection fraction visually estimated at 65 to 70 %.

## 2024-12-13 NOTE — H&P PST ADULT - NSICDXPASTMEDICALHX_GEN_ALL_CORE_FT
PAST MEDICAL HISTORY:  Chronic constipation     Diabetes mellitus     GERD (gastroesophageal reflux disease)     H/O cholelithiasis     H/O fracture of wrist     History of Helicobacter pylori infection     HLD (hyperlipidemia)     HTN (hypertension)     Infection of spine     Other intra-abdominal and pelvic swelling, mass and lump

## 2024-12-13 NOTE — H&P PST ADULT - MUSCULOSKELETAL
normal/ROM intact/normal gait/strength 5/5 bilateral upper extremities/strength 5/5 bilateral lower extremities details… normal/ROM intact/strength 5/5 bilateral upper extremities/strength 5/5 bilateral lower extremities/abnormal gait

## 2024-12-13 NOTE — H&P PST ADULT - PROBLEM SELECTOR PROBLEM 1
Other intra-abdominal and pelvic swelling, mass and lump [FreeTextEntry1] : established\par \par HTN\par poorly controlled\par will add lisinopril 10mg daily\par he will RTO 3 weeks for physical examination\par \par given referral for cardiology \par EKG showing possible anterior infarct and 1st degree AV block\par denies any symptoms of palpitations, chest pains, sob

## 2024-12-13 NOTE — H&P PST ADULT - HISTORY OF PRESENT ILLNESS
59 year old female with pmhx of HTN, DMII, HLD, GERD presents for pre-op evaluation for diagnosis code of Intra abdominal and pelvic swelling mass and lump. Patient c/o abdominal pain and went to [facility] and was found to have a right ovarian mass. Pt was admitted in [facility] for RUQ pain in June 2024 and had CT scan which showed A 4.3 cm complex right adnexal lesion. US abdomen showed Cholelithiasis. Pt s/p lap Cholecystectomy September 2024. She also went 2 weeks ago at ER [facility] for chest pain, found to be caused by GERD.   Pt c/o pelvic pain. Denies vaginal bleeding, spotting, fevers, cp, sob, holloway, n/v/d, dizziness, palpitations.  59 year old female with pmhx of HTN, DMII, HLD, GERD presents for pre-op evaluation for diagnosis code of Intra abdominal and pelvic swelling mass and lump. Pt was admitted in [facility] for RUQ pain in June 2024 and had CT scan which showed A 4.3 cm complex right adnexal lesion. US abdomen showed Cholelithiasis. Pt was also treated for H. Pylori gastritis. Pt s/p lap Cholecystectomy September 2024. She recently went to [facility] ER 2 weeks ago for chest pain, found to be caused by GERD. Pt c/o Intermittent pelvic pain. Denies vaginal bleeding, spotting, fevers, cp, sob, holloway, n/v/d, dizziness, palpitations.

## 2024-12-13 NOTE — H&P PST ADULT - NEGATIVE ENMT SYMPTOMS
Lower dentures,  Denies loose teeth./no hearing difficulty/no ear pain/no tinnitus/no vertigo/no sinus symptoms/no nasal congestion/no nasal discharge/no nasal obstruction/no post-nasal discharge/no nose bleeds/no dry mouth/no throat pain/no dysphagia

## 2024-12-13 NOTE — H&P PST ADULT - PROBLEM SELECTOR PLAN 1
Patient tentatively scheduled for Robotic Assisted total Laparoscopic hysterectomy, pelvic exam under anesthesia bilateral salpingectomy, possible ovarian cystectomy, possible oophorectomy on 12/19/24.  Pre-op instructions provided. Pt given verbal and written instructions with teach back on chlorhexidine shampoo and pepcid. Pt verbalized understanding with return demonstration.     CBC/Anemia, Type and screen/Abo, Ua, Urine cx, HgbA1c we done at PST.

## 2024-12-13 NOTE — H&P PST ADULT - NEUROLOGICAL
details… normal/cranial nerves II-XII intact/sensation intact/responds to pain/responds to verbal commands/no spontaneous movement

## 2024-12-13 NOTE — H&P PST ADULT - NEGATIVE NEUROLOGICAL SYMPTOMS
no weakness/no generalized seizures/no focal seizures/no syncope/no tremors/no vertigo/no difficulty walking/no headache/no facial palsy

## 2024-12-13 NOTE — H&P PST ADULT - REASON FOR ADMISSION
Robotic Assisted total Laparoscopic hysterectomy, pelvic exam under anesthesia bilateral salpingectomy, possible ovarian cystectomy, possible oophorectomy,

## 2024-12-15 LAB
CULTURE RESULTS: ABNORMAL
SPECIMEN SOURCE: SIGNIFICANT CHANGE UP

## 2024-12-18 NOTE — ASU PATIENT PROFILE, ADULT - HEALTH/HEALTHCARE ANXIETIES, PROFILE
Pt sleeping, resps unlabored. Rouses to speech. VSS. Report called. Family updated, all questions answered. O2tank is full for transport.    pre op anxiety

## 2024-12-18 NOTE — ASU PATIENT PROFILE, ADULT - FALL HARM RISK - UNIVERSAL INTERVENTIONS
Bed in lowest position, wheels locked, appropriate side rails in place/Call bell, personal items and telephone in reach/Instruct patient to call for assistance before getting out of bed or chair/Non-slip footwear when patient is out of bed/Wann to call system/Physically safe environment - no spills, clutter or unnecessary equipment/Purposeful Proactive Rounding/Room/bathroom lighting operational, light cord in reach

## 2024-12-18 NOTE — ASU PATIENT PROFILE, ADULT - DATE AND TIME PROVIDER WAS NOTIFIED
19-Dec-2024 Mart-1 - Negative Histology Text: MART-1 staining demonstrates a normal density and pattern of melanocytes along the dermal-epidermal junction. The surgical margins are negative for tumor cells.

## 2024-12-19 ENCOUNTER — TRANSCRIPTION ENCOUNTER (OUTPATIENT)
Age: 59
End: 2024-12-19

## 2024-12-19 ENCOUNTER — APPOINTMENT (OUTPATIENT)
Dept: OBGYN | Facility: HOSPITAL | Age: 59
End: 2024-12-19

## 2024-12-19 ENCOUNTER — OUTPATIENT (OUTPATIENT)
Dept: OUTPATIENT SERVICES | Facility: HOSPITAL | Age: 59
LOS: 1 days | Discharge: HOME | End: 2024-12-19
Payer: MEDICAID

## 2024-12-19 VITALS
HEART RATE: 68 BPM | RESPIRATION RATE: 16 BRPM | OXYGEN SATURATION: 96 % | DIASTOLIC BLOOD PRESSURE: 82 MMHG | SYSTOLIC BLOOD PRESSURE: 145 MMHG | TEMPERATURE: 98 F

## 2024-12-19 VITALS
TEMPERATURE: 99 F | SYSTOLIC BLOOD PRESSURE: 132 MMHG | WEIGHT: 136.03 LBS | HEIGHT: 62 IN | OXYGEN SATURATION: 98 % | DIASTOLIC BLOOD PRESSURE: 94 MMHG

## 2024-12-19 DIAGNOSIS — Z98.890 OTHER SPECIFIED POSTPROCEDURAL STATES: Chronic | ICD-10-CM

## 2024-12-19 DIAGNOSIS — R19.09 OTHER INTRA-ABDOMINAL AND PELVIC SWELLING, MASS AND LUMP: ICD-10-CM

## 2024-12-19 DIAGNOSIS — Z90.49 ACQUIRED ABSENCE OF OTHER SPECIFIED PARTS OF DIGESTIVE TRACT: Chronic | ICD-10-CM

## 2024-12-19 PROBLEM — K59.09 OTHER CONSTIPATION: Chronic | Status: ACTIVE | Noted: 2024-12-13

## 2024-12-19 PROBLEM — Z87.19 PERSONAL HISTORY OF OTHER DISEASES OF THE DIGESTIVE SYSTEM: Chronic | Status: ACTIVE | Noted: 2024-12-13

## 2024-12-19 PROBLEM — M46.20 OSTEOMYELITIS OF VERTEBRA, SITE UNSPECIFIED: Chronic | Status: ACTIVE | Noted: 2024-12-13

## 2024-12-19 PROBLEM — Z87.81 PERSONAL HISTORY OF (HEALED) TRAUMATIC FRACTURE: Chronic | Status: ACTIVE | Noted: 2024-12-13

## 2024-12-19 PROBLEM — Z86.19 PERSONAL HISTORY OF OTHER INFECTIOUS AND PARASITIC DISEASES: Chronic | Status: ACTIVE | Noted: 2024-12-13

## 2024-12-19 LAB — GLUCOSE BLDC GLUCOMTR-MCNC: 135 MG/DL — HIGH (ref 70–99)

## 2024-12-19 PROCEDURE — 88342 IMHCHEM/IMCYTCHM 1ST ANTB: CPT | Mod: 26

## 2024-12-19 PROCEDURE — 88307 TISSUE EXAM BY PATHOLOGIST: CPT | Mod: 26

## 2024-12-19 PROCEDURE — 88360 TUMOR IMMUNOHISTOCHEM/MANUAL: CPT | Mod: 26,59

## 2024-12-19 PROCEDURE — 58573 TLH W/T/O UTERUS OVER 250 G: CPT

## 2024-12-19 RX ORDER — OXYCODONE HYDROCHLORIDE 30 MG/1
1 TABLET ORAL
Qty: 5 | Refills: 0
Start: 2024-12-19

## 2024-12-19 RX ORDER — HYDROMORPHONE HYDROCHLORIDE 2 MG/1
0.5 TABLET ORAL ONCE
Refills: 0 | Status: DISCONTINUED | OUTPATIENT
Start: 2024-12-19 | End: 2024-12-19

## 2024-12-19 RX ORDER — 0.9 % SODIUM CHLORIDE 0.9 %
1000 INTRAVENOUS SOLUTION INTRAVENOUS
Refills: 0 | Status: ACTIVE | OUTPATIENT
Start: 2024-12-19 | End: 2025-11-17

## 2024-12-19 RX ORDER — SENNOSIDES 8.6 MG
1 TABLET ORAL
Refills: 0 | DISCHARGE

## 2024-12-19 RX ORDER — ONDANSETRON HYDROCHLORIDE 4 MG/1
4 TABLET, FILM COATED ORAL ONCE
Refills: 0 | Status: COMPLETED | OUTPATIENT
Start: 2024-12-19 | End: 2024-12-19

## 2024-12-19 RX ORDER — CHLORHEXIDINE GLUCONATE 1.2 MG/ML
1 RINSE ORAL ONCE
Refills: 0 | Status: COMPLETED | OUTPATIENT
Start: 2024-12-19 | End: 2024-12-19

## 2024-12-19 RX ORDER — OXYCODONE HYDROCHLORIDE 30 MG/1
5 TABLET ORAL ONCE
Refills: 0 | Status: DISCONTINUED | OUTPATIENT
Start: 2024-12-19 | End: 2024-12-19

## 2024-12-19 RX ORDER — FENTANYL 12 UG/H
50 PATCH, EXTENDED RELEASE TRANSDERMAL
Refills: 0 | Status: DISCONTINUED | OUTPATIENT
Start: 2024-12-19 | End: 2024-12-19

## 2024-12-19 RX ORDER — SIMETHICONE 125 MG
160 CAPSULE ORAL ONCE
Refills: 0 | Status: COMPLETED | OUTPATIENT
Start: 2024-12-19 | End: 2024-12-19

## 2024-12-19 RX ORDER — DOCUSATE SODIUM 100 MG
1 CAPSULE ORAL
Refills: 0 | DISCHARGE

## 2024-12-19 RX ORDER — SUCRALFATE 1 G/1
10 TABLET ORAL
Refills: 0 | DISCHARGE

## 2024-12-19 RX ORDER — IBUPROFEN 200 MG
1 TABLET ORAL
Qty: 56 | Refills: 0
Start: 2024-12-19 | End: 2025-01-01

## 2024-12-19 RX ORDER — ACETAMINOPHEN 500MG 500 MG/1
975 TABLET, COATED ORAL EVERY 6 HOURS
Refills: 0 | Status: ACTIVE | OUTPATIENT
Start: 2024-12-19 | End: 2025-11-17

## 2024-12-19 RX ORDER — OXYCODONE HYDROCHLORIDE 30 MG/1
1 TABLET ORAL
Qty: 8 | Refills: 0
Start: 2024-12-19 | End: 2024-12-20

## 2024-12-19 RX ORDER — IBUPROFEN 200 MG
600 TABLET ORAL EVERY 6 HOURS
Refills: 0 | Status: ACTIVE | OUTPATIENT
Start: 2024-12-19 | End: 2025-11-17

## 2024-12-19 RX ORDER — LISINOPRIL 20 MG/1
1 TABLET ORAL
Refills: 0 | DISCHARGE

## 2024-12-19 RX ORDER — ACETAMINOPHEN 500MG 500 MG/1
2 TABLET, COATED ORAL
Qty: 112 | Refills: 0
Start: 2024-12-19 | End: 2025-01-01

## 2024-12-19 RX ADMIN — CHLORHEXIDINE GLUCONATE 1 APPLICATION(S): 1.2 RINSE ORAL at 07:45

## 2024-12-19 RX ADMIN — Medication 160 MILLIGRAM(S): at 15:00

## 2024-12-19 RX ADMIN — Medication 125 MILLILITER(S): at 13:47

## 2024-12-19 RX ADMIN — OXYCODONE HYDROCHLORIDE 5 MILLIGRAM(S): 30 TABLET ORAL at 14:19

## 2024-12-19 RX ADMIN — ONDANSETRON HYDROCHLORIDE 4 MILLIGRAM(S): 4 TABLET, FILM COATED ORAL at 12:03

## 2024-12-19 RX ADMIN — OXYCODONE HYDROCHLORIDE 5 MILLIGRAM(S): 30 TABLET ORAL at 13:47

## 2024-12-19 NOTE — ASU DISCHARGE PLAN (ADULT/PEDIATRIC) - FINANCIAL ASSISTANCE
[facility] provides services at a reduced cost to those who are determined to be eligible through [facility]’s financial assistance program. Information regarding [facility]’s financial assistance program can be found by going to [email or web address] or by calling [phone].

## 2024-12-19 NOTE — BRIEF OPERATIVE NOTE - NSICDXBRIEFPOSTOP_GEN_ALL_CORE_FT
POST-OP DIAGNOSIS:  Pelvic mass 19-Dec-2024 11:31:22  Laurie Rodriguez  Fibroid uterus 19-Dec-2024 11:31:17  Laurie Rodriguez

## 2024-12-19 NOTE — BRIEF OPERATIVE NOTE - OPERATION/FINDINGS
EUA: Normal external genitalia. Normal vagina. Midline 8 week sized uterus. Right adnexal fullness appreciated. Left adnexa not palpable.   LSC: Atraumatic entry site. Normal appearing liver edge. Normal appearing bowel. Omentum noted to be tacked up to anterior wall along right side of abdomen. Greater than 30 minutes of lysis of adhesions performed. Normal appearing appendix. Normal appearing ovaries bilaterally. Normal appearing fallopian tubes. Previously noted right adnexal mass noted to be a broad ligament fibroid. Right ovary was normal.   Cysto: Bladder dome bubble appreciated. No evidence of damage to bladder. No evidence of suture material or laceration. Bilateral vigorous UO jets appreciated.   Excellent hemostasis.

## 2024-12-19 NOTE — CHART NOTE - NSCHARTNOTEFT_GEN_A_CORE
Patient seen and examined at bedside, recently post-op. No acute complaints at this time, endorsing abdominal pain. Denies CP, SOB, N/V, fevers, and chills.    Vital Signs Last 24 Hours  T(C): 36.7 (12-19-24 @ 13:00), Max: 37.2 (12-19-24 @ 06:49)  HR: 62 (12-19-24 @ 13:00) (62 - 78)  BP: 132/73 (12-19-24 @ 13:00) (110/65 - 136/87)  RR: 14 (12-19-24 @ 13:00) (12 - 20)  SpO2: 97% (12-19-24 @ 13:00) (97% - 100%)    I&O's Summary    19 Dec 2024 07:01  -  19 Dec 2024 13:42  --------------------------------------------------------  IN: 615 mL / OUT: 0 mL / NET: 615 mL        Physical Exam:  General: NAD  CV: NR, RR, S1, S2, no M/R/G  Lungs: CTA-B  Abdomen: Soft, appropriately tender, non-distended  Incision: 5 LSC incisions CDI  Ext: No pain or swelling        MEDICATIONS  (STANDING):  lactated ringers. 1000 milliLiter(s) (30 mL/Hr) IV Continuous <Continuous>  lactated ringers. 1000 milliLiter(s) (125 mL/Hr) IV Continuous <Continuous>    MEDICATIONS  (PRN):  acetaminophen     Tablet .. 975 milliGRAM(s) Oral every 6 hours PRN Mild Pain (1 - 3)  fentaNYL    Injectable 50 MICROGram(s) IV Push every 10 minutes PRN Severe Pain (7 - 10)  HYDROmorphone  Injectable 0.5 milliGRAM(s) IV Push once PRN Severe Pain (7 - 10)  ibuprofen  Tablet. 600 milliGRAM(s) Oral every 6 hours PRN Moderate Pain (4 - 6)  oxyCODONE    IR 5 milliGRAM(s) Oral once PRN Moderate Pain (4 - 6)      58 yo s/p RA LSC TLH, BSO, cysto recovering well in acute post-operative state.    Neuro: Pain controlled. PO pain meds   CV: Hemodynamically stable  Pulm: Encourage oob/ambulation  GI: Regular Diet  : DTV@7p  Heme: SCDs for DVT PPX  ID: afebrile  Endo: no active issues  Dispo: continue routine post-op care     TYLER Townsend  d/w GYN team

## 2024-12-19 NOTE — BRIEF OPERATIVE NOTE - NSICDXBRIEFPREOP_GEN_ALL_CORE_FT
PRE-OP DIAGNOSIS:  Abdominal pain 19-Dec-2024 11:31:04  Laurie Rodriguez  Fibroid uterus 19-Dec-2024 11:30:55  Laurie Rodriguez  Pelvic mass 19-Dec-2024 11:31:11  Laurie Rodriguez

## 2024-12-19 NOTE — BRIEF OPERATIVE NOTE - NSICDXBRIEFPROCEDURE_GEN_ALL_CORE_FT
PROCEDURES:  Exam under anesthesia, pelvic 19-Dec-2024 11:30:23  Laurie Rodriguez  Robot-assisted laparoscopic hysterectomy with cystoscopy 19-Dec-2024 11:30:30  Laurie Rodriguez  Laparoscopic salpingoophorectomy 19-Dec-2024 11:30:38  Laurie Rodriguez  Lysis of adhesions, pelvic 19-Dec-2024 11:33:13  Laurie Rodriguez

## 2024-12-19 NOTE — ASU DISCHARGE PLAN (ADULT/PEDIATRIC) - FOLLOW UP APPOINTMENTS
[facility] may also call Recovery Room (PACU) 24/7 @ [phone]/[facility]

## 2024-12-19 NOTE — ASU DISCHARGE PLAN (ADULT/PEDIATRIC) - ASU DC SPECIAL INSTRUCTIONSFT
- Please contact the office for any pain uncontrolled by medication, excessive bleeding, or fever>100.4F  - Please call the office for a follow-up with your doctor in 2 weeks  - You can take ibuprofen 600mg every 6 hours and tylenol 975mg every 6 hours as needed for pain.  - Oxycodone should be used for severe pain only.  - You may walk and climb stairs as often as you'd like, no vigorous activity, do not lift anything greater than 10lbs, nothing per vagina x 6 weeks, do not drive while on pain medication.  - Stool softeners (like senna) and mild laxatives like miralax can help with bowel regularity. Constipation is a common complaint after surgery and straining should be avoided.

## 2024-12-19 NOTE — ASU DISCHARGE PLAN (ADULT/PEDIATRIC) - MEDICATION INSTRUCTIONS
You were given 1000mg IV Tylenol for pain management.  Please DO NOT take any Tylenol containing products, such as  Vicodin, Percocet, Excedrin, many cold preparations for the next 6 hours (until 445p).  DO NOT EXCEED 4000MG OF TYLENOL OVER 24 HOURS.   You were given Toradol for pain management. Please DO Not take Motrin/Ibuprofen/Advil/Aleve (NSAIDS) for the next 6 hours (Until 5p)

## 2024-12-19 NOTE — ASU DISCHARGE PLAN (ADULT/PEDIATRIC) - CARE PROVIDER_API CALL
Master Lund  Obstetrics and Gynecology  [address] [place] [address]  Phone: [phone]  Fax: [phone]  Follow Up Time: 2 weeks

## 2025-01-08 ENCOUNTER — OUTPATIENT (OUTPATIENT)
Dept: OUTPATIENT SERVICES | Facility: HOSPITAL | Age: 60
LOS: 1 days | End: 2025-01-08

## 2025-01-08 ENCOUNTER — EMERGENCY (EMERGENCY)
Facility: HOSPITAL | Age: 60
LOS: 1 days | Discharge: HOME | End: 2025-01-08
Attending: EMERGENCY MEDICINE | Admitting: EMERGENCY MEDICINE
Payer: MEDICAID

## 2025-01-08 ENCOUNTER — APPOINTMENT (OUTPATIENT)
Dept: OBGYN | Facility: HOSPITAL | Age: 60
End: 2025-01-08

## 2025-01-08 VITALS
WEIGHT: 132.28 LBS | HEART RATE: 89 BPM | SYSTOLIC BLOOD PRESSURE: 136 MMHG | DIASTOLIC BLOOD PRESSURE: 85 MMHG | OXYGEN SATURATION: 98 % | TEMPERATURE: 98 F | RESPIRATION RATE: 18 BRPM | HEIGHT: 62 IN

## 2025-01-08 VITALS
HEART RATE: 86 BPM | DIASTOLIC BLOOD PRESSURE: 98 MMHG | RESPIRATION RATE: 16 BRPM | OXYGEN SATURATION: 100 % | SYSTOLIC BLOOD PRESSURE: 150 MMHG

## 2025-01-08 VITALS
BODY MASS INDEX: 24.29 KG/M2 | SYSTOLIC BLOOD PRESSURE: 146 MMHG | TEMPERATURE: 98.6 F | WEIGHT: 132 LBS | DIASTOLIC BLOOD PRESSURE: 87 MMHG | HEART RATE: 77 BPM | HEIGHT: 62 IN

## 2025-01-08 DIAGNOSIS — D21.9 BENIGN NEOPLASM OF CONNECTIVE AND OTHER SOFT TISSUE, UNSPECIFIED: ICD-10-CM

## 2025-01-08 DIAGNOSIS — Z98.890 OTHER SPECIFIED POSTPROCEDURAL STATES: Chronic | ICD-10-CM

## 2025-01-08 DIAGNOSIS — Z90.49 ACQUIRED ABSENCE OF OTHER SPECIFIED PARTS OF DIGESTIVE TRACT: Chronic | ICD-10-CM

## 2025-01-08 DIAGNOSIS — K59.00 CONSTIPATION, UNSPECIFIED: ICD-10-CM

## 2025-01-08 PROBLEM — K21.9 GASTRO-ESOPHAGEAL REFLUX DISEASE WITHOUT ESOPHAGITIS: Chronic | Status: ACTIVE | Noted: 2024-12-13

## 2025-01-08 PROBLEM — R19.09 OTHER INTRA-ABDOMINAL AND PELVIC SWELLING, MASS AND LUMP: Chronic | Status: ACTIVE | Noted: 2024-12-13

## 2025-01-08 LAB
ADD ON TEST-SPECIMEN IN LAB: SIGNIFICANT CHANGE UP
ALBUMIN SERPL ELPH-MCNC: 4.2 G/DL — SIGNIFICANT CHANGE UP (ref 3.3–5)
ALP SERPL-CCNC: 82 U/L — SIGNIFICANT CHANGE UP (ref 40–120)
ALT FLD-CCNC: 15 U/L — SIGNIFICANT CHANGE UP (ref 4–33)
ANION GAP SERPL CALC-SCNC: 14 MMOL/L — SIGNIFICANT CHANGE UP (ref 7–14)
APTT BLD: 33.8 SEC — SIGNIFICANT CHANGE UP (ref 24.5–35.6)
AST SERPL-CCNC: 14 U/L — SIGNIFICANT CHANGE UP (ref 4–32)
BASOPHILS # BLD AUTO: 0.04 K/UL — SIGNIFICANT CHANGE UP (ref 0–0.2)
BASOPHILS NFR BLD AUTO: 0.5 % — SIGNIFICANT CHANGE UP (ref 0–2)
BILIRUB SERPL-MCNC: 0.2 MG/DL — SIGNIFICANT CHANGE UP (ref 0.2–1.2)
BLD GP AB SCN SERPL QL: NEGATIVE — SIGNIFICANT CHANGE UP
BLOOD GAS VENOUS COMPREHENSIVE RESULT: SIGNIFICANT CHANGE UP
BUN SERPL-MCNC: 8 MG/DL — SIGNIFICANT CHANGE UP (ref 7–23)
CALCIUM SERPL-MCNC: 9.1 MG/DL — SIGNIFICANT CHANGE UP (ref 8.4–10.5)
CHLORIDE SERPL-SCNC: 104 MMOL/L — SIGNIFICANT CHANGE UP (ref 98–107)
CO2 SERPL-SCNC: 23 MMOL/L — SIGNIFICANT CHANGE UP (ref 22–31)
CREAT SERPL-MCNC: 0.47 MG/DL — LOW (ref 0.5–1.3)
EGFR: 110 ML/MIN/1.73M2 — SIGNIFICANT CHANGE UP
EGFR: 110 ML/MIN/1.73M2 — SIGNIFICANT CHANGE UP
EOSINOPHIL # BLD AUTO: 0.1 K/UL — SIGNIFICANT CHANGE UP (ref 0–0.5)
EOSINOPHIL NFR BLD AUTO: 1.3 % — SIGNIFICANT CHANGE UP (ref 0–6)
FLUAV AG NPH QL: SIGNIFICANT CHANGE UP
FLUBV AG NPH QL: SIGNIFICANT CHANGE UP
GLUCOSE SERPL-MCNC: 127 MG/DL — HIGH (ref 70–99)
HCT VFR BLD CALC: 35.9 % — SIGNIFICANT CHANGE UP (ref 34.5–45)
HGB BLD-MCNC: 11.4 G/DL — LOW (ref 11.5–15.5)
IANC: 4.3 K/UL — SIGNIFICANT CHANGE UP (ref 1.8–7.4)
IMM GRANULOCYTES NFR BLD AUTO: 0.3 % — SIGNIFICANT CHANGE UP (ref 0–0.9)
INR BLD: 1.02 RATIO — SIGNIFICANT CHANGE UP (ref 0.85–1.16)
LYMPHOCYTES # BLD AUTO: 2.65 K/UL — SIGNIFICANT CHANGE UP (ref 1–3.3)
LYMPHOCYTES # BLD AUTO: 35.6 % — SIGNIFICANT CHANGE UP (ref 13–44)
MCHC RBC-ENTMCNC: 24.8 PG — LOW (ref 27–34)
MCHC RBC-ENTMCNC: 31.8 G/DL — LOW (ref 32–36)
MCV RBC AUTO: 78.2 FL — LOW (ref 80–100)
MONOCYTES # BLD AUTO: 0.34 K/UL — SIGNIFICANT CHANGE UP (ref 0–0.9)
MONOCYTES NFR BLD AUTO: 4.6 % — SIGNIFICANT CHANGE UP (ref 2–14)
NEUTROPHILS # BLD AUTO: 4.3 K/UL — SIGNIFICANT CHANGE UP (ref 1.8–7.4)
NEUTROPHILS NFR BLD AUTO: 57.7 % — SIGNIFICANT CHANGE UP (ref 43–77)
NRBC # BLD AUTO: 0 K/UL — SIGNIFICANT CHANGE UP (ref 0–0)
NRBC # BLD: 0 /100 WBCS — SIGNIFICANT CHANGE UP (ref 0–0)
NRBC # FLD: 0 K/UL — SIGNIFICANT CHANGE UP (ref 0–0)
NRBC BLD-RTO: 0 /100 WBCS — SIGNIFICANT CHANGE UP (ref 0–0)
NT-PROBNP SERPL-SCNC: <36 PG/ML — SIGNIFICANT CHANGE UP
PLATELET # BLD AUTO: 320 K/UL — SIGNIFICANT CHANGE UP (ref 150–400)
POTASSIUM SERPL-MCNC: 4.1 MMOL/L — SIGNIFICANT CHANGE UP (ref 3.5–5.3)
POTASSIUM SERPL-SCNC: 4.1 MMOL/L — SIGNIFICANT CHANGE UP (ref 3.5–5.3)
PROT SERPL-MCNC: 7.2 G/DL — SIGNIFICANT CHANGE UP (ref 6–8.3)
PROTHROM AB SERPL-ACNC: 12.1 SEC — SIGNIFICANT CHANGE UP (ref 9.9–13.4)
RBC # BLD: 4.59 M/UL — SIGNIFICANT CHANGE UP (ref 3.8–5.2)
RBC # FLD: 14.1 % — SIGNIFICANT CHANGE UP (ref 10.3–14.5)
RH IG SCN BLD-IMP: POSITIVE — SIGNIFICANT CHANGE UP
RSV RNA NPH QL NAA+NON-PROBE: SIGNIFICANT CHANGE UP
SARS-COV-2 RNA SPEC QL NAA+PROBE: DETECTED
SODIUM SERPL-SCNC: 141 MMOL/L — SIGNIFICANT CHANGE UP (ref 135–145)
TROPONIN T, HIGH SENSITIVITY RESULT: 7 NG/L — SIGNIFICANT CHANGE UP
WBC # BLD: 7.45 K/UL — SIGNIFICANT CHANGE UP (ref 3.8–10.5)
WBC # FLD AUTO: 7.45 K/UL — SIGNIFICANT CHANGE UP (ref 3.8–10.5)

## 2025-01-08 PROCEDURE — 71046 X-RAY EXAM CHEST 2 VIEWS: CPT | Mod: 26

## 2025-01-08 PROCEDURE — 71275 CT ANGIOGRAPHY CHEST: CPT | Mod: 26

## 2025-01-08 PROCEDURE — 74177 CT ABD & PELVIS W/CONTRAST: CPT | Mod: 26

## 2025-01-08 PROCEDURE — 99024 POSTOP FOLLOW-UP VISIT: CPT

## 2025-01-08 PROCEDURE — 99285 EMERGENCY DEPT VISIT HI MDM: CPT

## 2025-01-08 PROCEDURE — 93010 ELECTROCARDIOGRAM REPORT: CPT

## 2025-01-08 RX ORDER — ACETAMINOPHEN 500 MG/5ML
1000 LIQUID (ML) ORAL ONCE
Refills: 0 | Status: COMPLETED | OUTPATIENT
Start: 2025-01-08 | End: 2025-01-08

## 2025-01-08 RX ORDER — KETOROLAC TROMETHAMINE 30 MG/ML
15 INJECTION, SOLUTION INTRAMUSCULAR; INTRAVENOUS ONCE
Refills: 0 | Status: DISCONTINUED | OUTPATIENT
Start: 2025-01-08 | End: 2025-01-08

## 2025-01-08 RX ADMIN — Medication 400 MILLIGRAM(S): at 17:30

## 2025-01-08 RX ADMIN — KETOROLAC TROMETHAMINE 15 MILLIGRAM(S): 30 INJECTION, SOLUTION INTRAMUSCULAR; INTRAVENOUS at 17:30

## 2025-01-09 DIAGNOSIS — D21.9 BENIGN NEOPLASM OF CONNECTIVE AND OTHER SOFT TISSUE, UNSPECIFIED: ICD-10-CM

## 2025-01-09 DIAGNOSIS — K59.00 CONSTIPATION, UNSPECIFIED: ICD-10-CM

## 2025-01-15 ENCOUNTER — NON-APPOINTMENT (OUTPATIENT)
Age: 60
End: 2025-01-15

## 2025-01-29 ENCOUNTER — OUTPATIENT (OUTPATIENT)
Dept: OUTPATIENT SERVICES | Facility: HOSPITAL | Age: 60
LOS: 1 days | End: 2025-01-29

## 2025-01-29 ENCOUNTER — APPOINTMENT (OUTPATIENT)
Dept: OBGYN | Facility: HOSPITAL | Age: 60
End: 2025-01-29

## 2025-01-29 VITALS
TEMPERATURE: 99.1 F | SYSTOLIC BLOOD PRESSURE: 157 MMHG | DIASTOLIC BLOOD PRESSURE: 87 MMHG | HEIGHT: 62 IN | WEIGHT: 135 LBS | BODY MASS INDEX: 24.84 KG/M2 | HEART RATE: 81 BPM

## 2025-01-29 DIAGNOSIS — Z98.890 OTHER SPECIFIED POSTPROCEDURAL STATES: Chronic | ICD-10-CM

## 2025-01-29 DIAGNOSIS — Z90.49 ACQUIRED ABSENCE OF OTHER SPECIFIED PARTS OF DIGESTIVE TRACT: Chronic | ICD-10-CM

## 2025-01-29 DIAGNOSIS — D21.9 BENIGN NEOPLASM OF CONNECTIVE AND OTHER SOFT TISSUE, UNSPECIFIED: ICD-10-CM

## 2025-01-29 PROCEDURE — 99213 OFFICE O/P EST LOW 20 MIN: CPT | Mod: 24

## 2025-01-30 DIAGNOSIS — D21.9 BENIGN NEOPLASM OF CONNECTIVE AND OTHER SOFT TISSUE, UNSPECIFIED: ICD-10-CM

## (undated) DEVICE — TROCAR SURGIQUEST AIRSEAL 5MM X 100MM

## (undated) DEVICE — Device

## (undated) DEVICE — XI SEAL UNIVERSIAL 5-12MM

## (undated) DEVICE — XI DRAPE ARM

## (undated) DEVICE — SUT VLOC 180 2-0 12" V-20 GREEN

## (undated) DEVICE — UTERINE MANIPULATOR DELINEATOR SC 3MM SM

## (undated) DEVICE — XI ARM FORCEP MARYLAND BIPOLAR

## (undated) DEVICE — POSITIONER PINK PAD PIGAZZI SYSTEM

## (undated) DEVICE — SUT VICRYL PLUS 0 27" CT-2 UNDYED

## (undated) DEVICE — SOL IRR BAG NS 0.9% 1000ML

## (undated) DEVICE — RUMI COLPO-PNEUMO OCCLUDER

## (undated) DEVICE — POSITIONER STRAP ARMBOARD VELCRO TS-30

## (undated) DEVICE — GOWN XL

## (undated) DEVICE — PACK PERI GYN

## (undated) DEVICE — XI ARM DISSECTOR CURVED BIPOLAR 8MM

## (undated) DEVICE — SYR LUER LOK 10CC

## (undated) DEVICE — PREP BETADINE KIT

## (undated) DEVICE — XI ARM NEEDLE DRIVER MEGA

## (undated) DEVICE — DRSG OPSITE 13.75 X 4"

## (undated) DEVICE — POSITIONER FOAM HEAD CRADLE (PINK)

## (undated) DEVICE — XI VESSEL SEALER

## (undated) DEVICE — GLV 8 PROTEXIS (BLUE)

## (undated) DEVICE — DRAPE SOL WARMING 44X44IN

## (undated) DEVICE — DRAPE UNDER BUTTOCKS W SCREEN

## (undated) DEVICE — GLV 7.5 PROTEXIS (WHITE)

## (undated) DEVICE — XI ARM FORCEP TENACULUM

## (undated) DEVICE — TUBING STRYKEFLOW II SUCTION / IRRIGATOR

## (undated) DEVICE — XI ARM PERMANENT CAUTERY SPATULA

## (undated) DEVICE — D HELP - CLEARVIEW CLEARIFY SYSTEM

## (undated) DEVICE — XI ARM FORCEP PROGRASP 8MM

## (undated) DEVICE — ELCTR BOVIE TIP BLADE INSULATED 2.75" EDGE

## (undated) DEVICE — XI ARM SCISSOR MONO CURVED

## (undated) DEVICE — SUT MONOCRYL 4-0 27" PS-2 UNDYED

## (undated) DEVICE — SUT VICRYL 0 27" UR-6

## (undated) DEVICE — GOWN LG

## (undated) DEVICE — PACK D&C

## (undated) DEVICE — TRAP SPECIMEN SPUTUM 40CC

## (undated) DEVICE — SUT VLOC 180 0 12" GS-21 GREEN

## (undated) DEVICE — ENDOCATCH 10MM

## (undated) DEVICE — XI ARM FORCEP FENESTRATED BIPOLAR 8MM

## (undated) DEVICE — XI DRAPE COLUMN

## (undated) DEVICE — XI TIP COVER

## (undated) DEVICE — TUBING AIRSEAL TRI-LUMEN FILTERED

## (undated) DEVICE — FOLEY TRAY 16FR 5CC LF UMETER CLOSED

## (undated) DEVICE — XI OBTURATOR OPTICAL BLADELESS 8MM

## (undated) DEVICE — SYR LUER LOK 50CC

## (undated) DEVICE — ELCTR BOVIE PENCIL SMOKE EVACUATION

## (undated) DEVICE — PACK ROBOTIC LIJ

## (undated) DEVICE — SI OBTURATOR BLADELESS 8MM

## (undated) DEVICE — XI ARM PERMANENT CAUTERY HOOK

## (undated) DEVICE — POSITIONER PURPLE ARM ONE STEP (LARGE)